# Patient Record
Sex: FEMALE | Race: WHITE | NOT HISPANIC OR LATINO | Employment: OTHER | URBAN - METROPOLITAN AREA
[De-identification: names, ages, dates, MRNs, and addresses within clinical notes are randomized per-mention and may not be internally consistent; named-entity substitution may affect disease eponyms.]

---

## 2023-09-10 PROBLEM — H34.11 CENTRAL RETINAL ARTERY OCCLUSION OF RIGHT EYE: Status: ACTIVE | Noted: 2023-09-10

## 2023-09-10 PROBLEM — I63.50 CEREBROVASCULAR ACCIDENT (CVA) DUE TO OCCLUSION OF CEREBRAL ARTERY (MULTI): Status: ACTIVE | Noted: 2023-09-10

## 2023-09-10 PROBLEM — R00.1 BRADYCARDIA: Status: ACTIVE | Noted: 2023-09-10

## 2023-09-10 PROBLEM — K64.9 HEMORRHOIDS: Status: ACTIVE | Noted: 2023-09-10

## 2023-09-10 PROBLEM — E87.6 HYPOKALEMIA: Status: ACTIVE | Noted: 2023-09-10

## 2023-09-10 PROBLEM — D48.5 NEOPLASM OF UNCERTAIN BEHAVIOR OF SKIN: Status: ACTIVE | Noted: 2023-07-19

## 2023-09-10 PROBLEM — K92.2 GI BLEEDING: Status: ACTIVE | Noted: 2023-09-10

## 2023-09-10 PROBLEM — R60.0 BILATERAL LOWER EXTREMITY EDEMA: Status: ACTIVE | Noted: 2023-09-10

## 2023-09-10 PROBLEM — K52.1 DRUG-INDUCED DIARRHEA: Status: ACTIVE | Noted: 2023-09-10

## 2023-09-10 PROBLEM — R41.89 COGNITIVE DEFICITS: Status: ACTIVE | Noted: 2023-09-10

## 2023-09-10 PROBLEM — T38.0X5A STEROID-INDUCED DIABETES MELLITUS (CORRECT AND PROPERLY ADMINISTERED) (MULTI): Status: ACTIVE | Noted: 2023-09-10

## 2023-09-10 PROBLEM — E09.9 STEROID-INDUCED DIABETES MELLITUS (CORRECT AND PROPERLY ADMINISTERED) (MULTI): Status: ACTIVE | Noted: 2023-09-10

## 2023-09-10 PROBLEM — R15.9 INCONTINENCE OF FECES: Status: ACTIVE | Noted: 2023-09-10

## 2023-09-10 PROBLEM — Z86.73 HISTORY OF STROKE: Status: ACTIVE | Noted: 2023-09-10

## 2023-09-10 PROBLEM — D22.5 MELANOCYTIC NEVI OF TRUNK: Status: ACTIVE | Noted: 2023-07-19

## 2023-09-10 PROBLEM — H61.23 CERUMINOSIS, BILATERAL: Status: ACTIVE | Noted: 2023-09-10

## 2023-09-10 PROBLEM — C44.92 SCCA (SQUAMOUS CELL CARCINOMA) OF SKIN: Status: ACTIVE | Noted: 2023-09-10

## 2023-09-10 PROBLEM — Z79.01 ON APIXABAN THERAPY: Status: ACTIVE | Noted: 2023-09-10

## 2023-09-10 PROBLEM — I48.0 PAROXYSMAL ATRIAL FIBRILLATION (MULTI): Status: ACTIVE | Noted: 2023-09-10

## 2023-09-10 PROBLEM — K21.9 GASTROESOPHAGEAL REFLUX DISEASE WITHOUT ESOPHAGITIS: Status: ACTIVE | Noted: 2023-09-10

## 2023-09-10 PROBLEM — I71.40 ABDOMINAL AORTIC ANEURYSM (AAA) WITHOUT RUPTURE (CMS-HCC): Status: ACTIVE | Noted: 2023-09-10

## 2023-09-10 PROBLEM — N18.9 ANEMIA IN CKD (CHRONIC KIDNEY DISEASE): Status: ACTIVE | Noted: 2023-09-10

## 2023-09-10 PROBLEM — Z79.899 LONG-TERM CURRENT USE OF PROTON PUMP INHIBITOR THERAPY: Status: ACTIVE | Noted: 2023-09-10

## 2023-09-10 PROBLEM — R07.9 CHEST PAIN: Status: ACTIVE | Noted: 2023-09-10
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
## 2023-09-10 PROBLEM — H53.9 VISUAL CHANGES: Status: ACTIVE | Noted: 2023-09-10

## 2023-09-10 PROBLEM — I63.9 RECURRENT STROKES (MULTI): Status: ACTIVE | Noted: 2023-09-10

## 2023-09-10 PROBLEM — R32 URINARY INCONTINENCE: Status: ACTIVE | Noted: 2023-09-10

## 2023-09-10 PROBLEM — H43.813 PVD (POSTERIOR VITREOUS DETACHMENT), BOTH EYES: Status: ACTIVE | Noted: 2023-09-10

## 2023-09-10 PROBLEM — D64.89 OTHER SPECIFIED ANEMIAS: Status: RESOLVED | Noted: 2023-09-10 | Resolved: 2023-09-10

## 2023-09-10 PROBLEM — E11.9 TYPE 2 DIABETES MELLITUS (MULTI): Status: ACTIVE | Noted: 2023-09-10

## 2023-09-10 PROBLEM — H90.3 BILATERAL SENSORINEURAL HEARING LOSS: Status: ACTIVE | Noted: 2023-09-10

## 2023-09-10 PROBLEM — D63.1 ANEMIA IN CKD (CHRONIC KIDNEY DISEASE): Status: ACTIVE | Noted: 2023-09-10

## 2023-09-10 PROBLEM — N63.20 MASS OF BREAST, LEFT: Status: ACTIVE | Noted: 2023-09-10

## 2023-09-10 PROBLEM — H31.103 MYOPIC RETINOPATHY OF BOTH EYES: Status: ACTIVE | Noted: 2023-09-10

## 2023-09-10 PROBLEM — D64.89 OTHER SPECIFIED ANEMIAS: Status: ACTIVE | Noted: 2023-09-10

## 2023-09-10 PROBLEM — Z85.820 PERSONAL HISTORY OF MALIGNANT MELANOMA OF SKIN: Status: ACTIVE | Noted: 2023-07-19

## 2023-09-10 PROBLEM — R13.12 OROPHARYNGEAL DYSPHAGIA: Status: ACTIVE | Noted: 2023-09-10

## 2023-09-10 PROBLEM — Z96.649 S/P HIP HEMIARTHROPLASTY: Status: ACTIVE | Noted: 2023-09-10

## 2023-09-10 PROBLEM — R91.8 OPACITY OF LUNG ON IMAGING STUDY: Status: ACTIVE | Noted: 2023-09-10

## 2023-09-10 PROBLEM — T14.8XXA HEMATOMA: Status: ACTIVE | Noted: 2023-09-10

## 2023-09-10 PROBLEM — R53.81 PHYSICAL DEBILITY: Status: ACTIVE | Noted: 2023-09-10

## 2023-09-10 PROBLEM — E78.5 HYPERLIPIDEMIA: Status: ACTIVE | Noted: 2023-09-10
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
## 2023-09-10 PROBLEM — T14.8XXA HEMATOMA: Status: RESOLVED | Noted: 2023-09-10 | Resolved: 2023-09-10

## 2023-09-10 PROBLEM — S72.009A CLOSED FRACTURE OF HIP (MULTI): Status: ACTIVE | Noted: 2023-09-10

## 2023-09-10 PROBLEM — I10 ESSENTIAL HYPERTENSION: Status: ACTIVE | Noted: 2023-09-10

## 2023-09-10 PROBLEM — J45.20 INTERMITTENT ASTHMA (HHS-HCC): Status: ACTIVE | Noted: 2023-09-10

## 2023-09-10 PROBLEM — R29.6 RECURRENT FALLS: Status: ACTIVE | Noted: 2023-09-10

## 2023-09-10 PROBLEM — N18.30 CKD (CHRONIC KIDNEY DISEASE) STAGE 3, GFR 30-59 ML/MIN (MULTI): Status: ACTIVE | Noted: 2023-09-10

## 2023-09-10 PROBLEM — N18.4 CKD (CHRONIC KIDNEY DISEASE) STAGE 4, GFR 15-29 ML/MIN (MULTI): Status: ACTIVE | Noted: 2023-09-10

## 2023-09-10 PROBLEM — G43.109 MIGRAINE WITH VISUAL AURA: Status: ACTIVE | Noted: 2023-09-10

## 2023-09-10 PROBLEM — R73.9 HYPERGLYCEMIA: Status: ACTIVE | Noted: 2023-09-10

## 2023-09-10 PROBLEM — M35.3 PMR (POLYMYALGIA RHEUMATICA) (MULTI): Status: ACTIVE | Noted: 2023-09-10

## 2023-09-10 PROBLEM — D64.9 ANEMIA: Status: ACTIVE | Noted: 2023-09-10

## 2023-09-10 PROBLEM — G47.00 INSOMNIA: Status: ACTIVE | Noted: 2023-09-10

## 2023-09-10 PROBLEM — L57.0 ACTINIC KERATOSIS: Status: ACTIVE | Noted: 2023-07-19

## 2023-09-10 PROBLEM — I69.359 HEMIPARESIS AFFECTING DOMINANT SIDE AS LATE EFFECT OF STROKE (MULTI): Status: ACTIVE | Noted: 2023-09-10

## 2023-09-10 PROBLEM — K58.9 IRRITABLE BOWEL SYNDROME: Status: ACTIVE | Noted: 2023-09-10

## 2023-09-10 RX ORDER — CALCIUM CARBONATE 500(1250)
1 TABLET,CHEWABLE ORAL DAILY
COMMUNITY
End: 2023-12-27 | Stop reason: ALTCHOICE

## 2023-09-10 RX ORDER — OMEPRAZOLE 20 MG/1
1 TABLET, DELAYED RELEASE ORAL DAILY
COMMUNITY
End: 2023-12-27 | Stop reason: ALTCHOICE

## 2023-09-10 RX ORDER — POLYETHYLENE GLYCOL 3350 17 G/17G
POWDER, FOR SOLUTION ORAL
COMMUNITY
Start: 2021-11-23

## 2023-09-10 RX ORDER — ROSUVASTATIN CALCIUM 40 MG/1
1 TABLET, COATED ORAL NIGHTLY
COMMUNITY
Start: 2021-07-27 | End: 2023-12-27 | Stop reason: WASHOUT

## 2023-09-10 RX ORDER — GABAPENTIN 100 MG/1
2 CAPSULE ORAL NIGHTLY
COMMUNITY
Start: 2020-10-22

## 2023-09-10 RX ORDER — TRAMADOL HYDROCHLORIDE 50 MG/1
0.5 TABLET ORAL EVERY 6 HOURS PRN
COMMUNITY
Start: 2021-05-25 | End: 2023-12-27 | Stop reason: ALTCHOICE

## 2023-09-10 RX ORDER — ACETAMINOPHEN 325 MG/1
2 TABLET ORAL EVERY 6 HOURS PRN
COMMUNITY
Start: 2021-05-25 | End: 2023-12-27 | Stop reason: ALTCHOICE

## 2023-09-10 RX ORDER — HYDROCHLOROTHIAZIDE 12.5 MG/1
1 CAPSULE ORAL DAILY
COMMUNITY

## 2023-09-10 RX ORDER — DOCUSATE SODIUM 100 MG/1
1 CAPSULE, LIQUID FILLED ORAL 2 TIMES DAILY
COMMUNITY
Start: 2020-10-22

## 2023-09-10 RX ORDER — CITALOPRAM 10 MG/1
1 TABLET ORAL DAILY
COMMUNITY
Start: 2020-10-22 | End: 2023-12-27 | Stop reason: ALTCHOICE

## 2023-09-10 RX ORDER — ATORVASTATIN CALCIUM 80 MG/1
80 TABLET, FILM COATED ORAL NIGHTLY
COMMUNITY

## 2023-09-10 RX ORDER — AMLODIPINE BESYLATE 5 MG/1
1 TABLET ORAL 2 TIMES DAILY
COMMUNITY
Start: 2020-10-22

## 2023-09-10 RX ORDER — MONTELUKAST SODIUM 10 MG/1
1 TABLET ORAL NIGHTLY
COMMUNITY
Start: 2020-10-22

## 2023-09-10 RX ORDER — BISACODYL 10 MG/1
10 SUPPOSITORY RECTAL DAILY PRN
COMMUNITY
Start: 2020-10-22

## 2023-09-10 RX ORDER — CYANOCOBALAMIN (VITAMIN B-12) 500 MCG
1 TABLET ORAL NIGHTLY
COMMUNITY
Start: 2020-10-22

## 2023-09-10 RX ORDER — VIT C/E/ZN/COPPR/LUTEIN/ZEAXAN 250MG-90MG
CAPSULE ORAL
COMMUNITY

## 2023-09-10 RX ORDER — PREDNISONE 2.5 MG/1
2.5 TABLET ORAL
COMMUNITY
Start: 2021-10-21

## 2023-09-10 RX ORDER — CHOLECALCIFEROL (VITAMIN D3)
CRYSTALS MISCELLANEOUS
COMMUNITY
Start: 2021-11-23 | End: 2023-12-27 | Stop reason: ALTCHOICE

## 2023-09-10 RX ORDER — METFORMIN HYDROCHLORIDE 500 MG/1
0.5 TABLET ORAL
COMMUNITY
End: 2023-12-27 | Stop reason: ALTCHOICE

## 2023-09-10 RX ORDER — SENNOSIDES 8.6 MG/1
1 TABLET ORAL NIGHTLY PRN
COMMUNITY

## 2023-09-10 RX ORDER — OMEPRAZOLE 40 MG/1
40 CAPSULE, DELAYED RELEASE ORAL DAILY
COMMUNITY
End: 2023-12-27 | Stop reason: ALTCHOICE

## 2023-09-10 RX ORDER — FERROUS SULFATE, DRIED 160(50) MG
TABLET, EXTENDED RELEASE ORAL
COMMUNITY
Start: 2021-11-23

## 2023-09-10 RX ORDER — POTASSIUM CHLORIDE 20 MEQ/1
1 TABLET, EXTENDED RELEASE ORAL DAILY
COMMUNITY

## 2023-09-10 RX ORDER — LIDOCAINE 50 MG/G
PATCH TOPICAL
COMMUNITY
Start: 2021-05-25

## 2023-09-10 RX ORDER — CYANOCOBALAMIN (VITAMIN B-12) 500 MCG
1 TABLET ORAL DAILY
COMMUNITY
End: 2023-12-27 | Stop reason: ALTCHOICE

## 2023-09-10 RX ORDER — ASCORBIC ACID 500 MG
500 TABLET ORAL DAILY
COMMUNITY

## 2023-09-10 RX ORDER — FERROUS SULFATE 325(65) MG
1 TABLET, DELAYED RELEASE (ENTERIC COATED) ORAL DAILY
COMMUNITY

## 2023-09-10 RX ORDER — ACETAMINOPHEN 500 MG
1 TABLET ORAL AS NEEDED
COMMUNITY
Start: 2020-10-22

## 2023-09-10 RX ORDER — NAPROXEN SODIUM 220 MG/1
1 TABLET, FILM COATED ORAL DAILY
COMMUNITY
End: 2023-12-27 | Stop reason: ALTCHOICE

## 2023-09-10 RX ORDER — METFORMIN HYDROCHLORIDE 500 MG/1
1 TABLET ORAL
COMMUNITY
Start: 2021-01-28 | End: 2023-12-27 | Stop reason: ALTCHOICE

## 2023-09-10 RX ORDER — PREDNISONE 5 MG/1
1 TABLET ORAL DAILY
COMMUNITY
End: 2023-12-27 | Stop reason: DRUGHIGH

## 2023-09-10 RX ORDER — HYDROCORTISONE 25 MG/G
CREAM TOPICAL
COMMUNITY
Start: 2021-11-23

## 2023-10-13 ENCOUNTER — APPOINTMENT (OUTPATIENT)
Dept: OPHTHALMOLOGY | Facility: CLINIC | Age: 88
End: 2023-10-13
Payer: MEDICARE

## 2023-10-14 DIAGNOSIS — Z46.1 HEARING AID FITTING OR ADJUSTMENT: ICD-10-CM

## 2023-10-14 DIAGNOSIS — H90.3 BILATERAL SENSORINEURAL HEARING LOSS: Primary | ICD-10-CM

## 2023-10-14 NOTE — PROGRESS NOTES
NP received request from Bret RUFFIN on 10/13/2023 that patient needs a referral to Audiology at  to have hearing aids adjusted.   Referral Sent

## 2023-10-23 ENCOUNTER — OFFICE VISIT (OUTPATIENT)
Dept: DERMATOLOGY | Facility: CLINIC | Age: 88
End: 2023-10-23
Payer: MEDICARE

## 2023-10-23 DIAGNOSIS — C44.329: Primary | ICD-10-CM

## 2023-10-23 PROCEDURE — 1126F AMNT PAIN NOTED NONE PRSNT: CPT | Performed by: DERMATOLOGY

## 2023-10-23 PROCEDURE — 99214 OFFICE O/P EST MOD 30 MIN: CPT | Performed by: DERMATOLOGY

## 2023-10-23 PROCEDURE — 17311 MOHS 1 STAGE H/N/HF/G: CPT | Performed by: DERMATOLOGY

## 2023-10-23 PROCEDURE — 1159F MED LIST DOCD IN RCRD: CPT | Performed by: DERMATOLOGY

## 2023-10-23 NOTE — PROGRESS NOTES
Mohs Surgery Operative Note    Date of Surgery:  10/23/2023  Surgeon:  Ruslan Guerrero MD  Office Location: [facility] [address]  [place] [address]  [facility] [address]  [facility] [address]  Dept: [phone]  Dept Fax: [phone]  Referring Provider: David Matthew MD  [id number]  Department of Dermatology  [place] [id number]      Assessment/Plan   Pre-procedure:   Obtained informed consent: written from patient  The surgical site was identified and confirmed with the patient.     Intra-operative:   Audible time out called at : 10:18 AM 10/23/23  by: Courtney Chowdary MA   Verified patient name, birthdate, site, specimen bottle label & requisition.    The planned procedure(s) was again reviewed with the patient. The risks of bleeding, infection, nerve damage and scarring were reviewed. Written authorization was obtained. The patient identity, surgical site, and planned procedure(s) were verified. The provider acted as both surgeon and pathologist.     Squamous cell carcinoma of skin  right lateral chin    Mohs surgery    Consent obtained: written    Universal Protocol:  Procedure explained and questions answered to patient or proxy's satisfaction: Yes    Test results available and properly labeled: Yes    Pathology report reviewed: Yes    External notes reviewed: Yes    Photo or diagram used for site identification: Yes    Site/side marked: Yes    Slide independently reviewed by Mohs surgeon: Yes    Immediately prior to procedure a time out was called: Yes    Patient identity confirmed: verbally with patient  Preparation: Patient was prepped and draped in usual sterile fashion      Anticoagulation:  Is the patient taking prescription anticoagulant and/or aspirin prescribed/recommended by a physician? Yes    Was the anticoagulation regimen changed prior to Mohs? No      Anesthesia:  Anesthesia method: local infiltration  Local anesthetic: lidocaine 2% WITH  epi    Procedure Details:  Biopsy accession number: [id number]  Date of biopsy: 5/17/2023  Pre-Op diagnosis: squamous cell carcinoma  SCC subtype: in situ  Surgery side: right  Surgical site (from skin exam): right lateral chin  Pre-operative length (cm): 0.7  Pre-operative width (cm): 0.7  Indications for Mohs surgery: anatomic location where tissue conservation is critical  Mohs Appropriate Use Criteria Score: 1    Micrographic Surgery Details:  Post-operative length (cm): 1  Post-operative width (cm): 1  Number of Mohs stages: 1    Stage 1     Comments: The patient was brought into the operating room and placed in the procedure chair in the appropriate position.  The area positive by previous biopsy was identified and confirmed with the patient. The area of clinically obvious tumor was debulked using a curette and/or scalpel as needed. An incision was made following the Mohs approach through the skin. The specimen was taken to the lab, divided into 2 piece(s) and appropriately chromacoded and processed.                 Tumor features identified on Mohs section: no tumor identified    Depth of defect: dermis    Patient tolerance of procedure: tolerated well, no immediate complications    Reconstruction:  Was the defect reconstructed?: No    Fine/surface layer approximation (top stitches)   Hemostasis achieved with: electrodesiccation  Outcome: patient tolerated procedure well with no complications    Post-procedure details: sterile dressing applied and wound care instructions given    Dressing type: Gelfoam and pressure dressing    Additional details:  Repair: After a discussion with the patient regarding the options for wound closure, a decision was made to proceed with second intention healing.  Dressing F/U: Surgifoam was placed in the wound. A pressure dressing was placed to help stabilize the wound and to minimize the risk of postoperative bleeding. Wound care was discussed, and the patient was given written  post-operative wound care instructions.                   The final repair measured 1.0 x 1.0 cm    Wound care was discussed, and the patient was given written post-operative wound care instructions.      The patient will follow up with Ruslan Guerrero MD as needed for any post operative problems or concerns, and will follow up with their primary dermatologist as scheduled.

## 2023-10-23 NOTE — PROGRESS NOTES
Office Visit Note  Date: 10/23/2023  Surgeon:  Ruslan Guerrero MD  Office Location: [facility] [address]  [facility]  [address]  [facility] [address]  [facility] [address]  Dept: [phone]  Dept Fax: [phone]  Referring Provider: David Matthew MD  [id number]  Department of Dermatology  [place] [id number]    [facility]   Courtney Zhou is a 89 y.o. female who presents for the following: MOHS Surgery    According to the patient, the lesion has been presnt for approximately greater than 1 year at the time of diagnosis.  The lesion is painful.  The lesion has not been treated previously.    The patient does not have a pacemaker / defibrillator.    The patient is on blood thinners.  The patient does not have a history of hepatitis B or C.  The patient does not have a history of HIV.    Review of Systems:  No other skin or systemic complaints other than what is documented elsewhere in the note.    MEDICAL HISTORY: clinically relevant history including significant past medical history, medications and allergies was reviewed and documented in Epic.    Objective   Well appearing patient in no apparent distress; mood and affect are within normal limits.  Vital signs: See record.  Noted on the right lateral chin  Is a 0.7 x 0.7 cm scar        The patient confirmed the identified site.    Discussion:  The nature of the diagnosis was explained. The lesion is a skin cancer.  It has a risk of local growth and distant spread. The condition is associated with sun exposure.  Warning signs of non-melanoma skin cancer discussed. Patient was instructed to perform monthly self skin examination.  We recommended that the patient have regular full skin exams given an increased risk of subsequent skin cancers. The patient was instructed to use sun protective behaviors including use of broad spectrum sunscreens and sun protective clothing to reduce risk of skin cancers.      Risks, benefits, side  effects of Mohs surgery were discussed with patient and the patient voiced understanding.  It was explained that even though the cure rate of Mohs is very high it is not 100%. Risks of surgery including but not limited to bleeding, infection, numbness, nerve damage, and scar were reviewed.  Discussion included wound care requirements, activity restrictions, likely scar outcome and time to heal.     After Mohs surgery, the defect may need to be repaired surgically and the scar may be longer than the original lesion.  Reconstruction options, risks, and benefits were reviewed including second intention healing, linear repair (4-1 ratio was explained), local flaps, skin grafts, cartilage grafts and interpolation flaps (the need for multiple surgeries was explained). Possible outcomes were reviewed including likely scar appearance, failure of flap survival, infection, bleeding and the need for revision surgery.     The pathology was reviewed, the photograph was reviewed, and the referring physician's note was reviewed.    Patient elected for Mohs surgery.    The patient has a squamous cell carcinoma.  The pathology was reviewed, the photograph was reviewed, and the referring physicians note was reviewed.  Multiple treatment options including mohs surgery (which has moderate risk of morbidity) were reviewed.    Medical Decision Making  Column 1- Squamous Cell Carcinoma (1 acute uncomplicated illness- Low)  Column 2- 3 tests reviewed (pathology, photograph, referring physician notes- Moderate)  Column 3- Modertate risk of morbidity from additional treatment- mohs surgry- Moderate)    Overall Moderate MDM

## 2023-11-17 ENCOUNTER — OFFICE VISIT (OUTPATIENT)
Dept: OPHTHALMOLOGY | Facility: CLINIC | Age: 88
End: 2023-11-17
Payer: MEDICARE

## 2023-11-17 DIAGNOSIS — H43.813 PVD (POSTERIOR VITREOUS DETACHMENT), BOTH EYES: Primary | ICD-10-CM

## 2023-11-17 DIAGNOSIS — H31.103 MYOPIC RETINOPATHY OF BOTH EYES: ICD-10-CM

## 2023-11-17 DIAGNOSIS — E11.00 TYPE 2 DIABETES MELLITUS WITH HYPEROSMOLARITY WITHOUT COMA, WITHOUT LONG-TERM CURRENT USE OF INSULIN (MULTI): ICD-10-CM

## 2023-11-17 DIAGNOSIS — H34.11 CENTRAL RETINAL ARTERY OCCLUSION OF RIGHT EYE: ICD-10-CM

## 2023-11-17 PROCEDURE — 92134 CPTRZ OPH DX IMG PST SGM RTA: CPT | Mod: BILATERAL PROCEDURE | Performed by: OPHTHALMOLOGY

## 2023-11-17 PROCEDURE — 99213 OFFICE O/P EST LOW 20 MIN: CPT | Performed by: OPHTHALMOLOGY

## 2023-11-17 RX ORDER — ESOMEPRAZOLE MAGNESIUM 40 MG/1
CAPSULE, DELAYED RELEASE ORAL
COMMUNITY
End: 2023-12-27 | Stop reason: ALTCHOICE

## 2023-11-17 RX ORDER — LOSARTAN POTASSIUM 100 MG/1
TABLET ORAL
COMMUNITY
End: 2023-12-27 | Stop reason: WASHOUT

## 2023-11-17 RX ORDER — FLUTICASONE PROPIONATE AND SALMETEROL 250; 50 UG/1; UG/1
POWDER RESPIRATORY (INHALATION)
COMMUNITY

## 2023-11-17 RX ORDER — CHLORHEXIDINE GLUCONATE ORAL RINSE 1.2 MG/ML
SOLUTION DENTAL
COMMUNITY
End: 2024-02-09 | Stop reason: ALTCHOICE

## 2023-11-17 RX ORDER — CLOBETASOL PROPIONATE 0.5 MG/G
CREAM TOPICAL
COMMUNITY

## 2023-11-17 RX ORDER — LOPERAMIDE HYDROCHLORIDE 2 MG/1
CAPSULE ORAL
COMMUNITY
Start: 2023-09-19

## 2023-11-17 RX ORDER — METOPROLOL SUCCINATE 25 MG/1
TABLET, EXTENDED RELEASE ORAL
COMMUNITY
End: 2023-12-27 | Stop reason: ALTCHOICE

## 2023-11-17 ASSESSMENT — ENCOUNTER SYMPTOMS
PSYCHIATRIC NEGATIVE: 0
ENDOCRINE NEGATIVE: 0
ALLERGIC/IMMUNOLOGIC NEGATIVE: 0
GASTROINTESTINAL NEGATIVE: 0
HEMATOLOGIC/LYMPHATIC NEGATIVE: 0
MUSCULOSKELETAL NEGATIVE: 0
RESPIRATORY NEGATIVE: 0
CONSTITUTIONAL NEGATIVE: 0
EYES NEGATIVE: 0
CARDIOVASCULAR NEGATIVE: 0
NEUROLOGICAL NEGATIVE: 0

## 2023-11-17 ASSESSMENT — TONOMETRY
IOP_METHOD: TONOPEN
OD_IOP_MMHG: 15
OS_IOP_MMHG: 14

## 2023-11-17 ASSESSMENT — VISUAL ACUITY
OS_CC: 20/30
OS_PH_CC: 20/25
METHOD: SNELLEN - LINEAR
CORRECTION_TYPE: GLASSES
OD_CC: 20/40

## 2023-11-17 NOTE — PROGRESS NOTES
Assessment/Plan   Diagnoses and all orders for this visit:  PVD (posterior vitreous detachment), both eyes  -     OCT, Retina - OU - Both Eyes  Myopic retinopathy of both eyes  Central retinal artery occlusion of right eye  Type 2 diabetes mellitus with hyperosmolarity without coma, without long-term current use of insulin (CMS/HCC)    1 H34.11 Central retinal artery occlusion of right eye-Improving  2 H52.13 Myopic retinopathy of both eyes-Stable  3 G43.109 Migraine with visual aura-Stable  4 H43.813 Pvd (posterior vitreous detachment), both eyes-Stable  5 H50.30 Esotropia, intermittent-Stable  6 H53.431 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-Stable          Discussion      PROCEDURE PRIOR  Fundus  image : Fundus: no edema                                    Optic nerve : No edema OU no NVD                                  Macula NO CME OU but dull reflex OU    Fluorescein angiogram (FA) performed after appropriate counselling and discussion with consent  Transit OD  flow time normal (hand to eye)    Late vascular blockage OD central infero temoral  No optic disc leakage no CME OU      This FA is c/w  incomplete BRAO     this is equivalnet to a stroke  pt family informed       she needs to see Cardiology asap (will arrange)  echo heart   carotid duplex    f/u 6m

## 2023-11-20 ENCOUNTER — CLINICAL SUPPORT (OUTPATIENT)
Dept: AUDIOLOGY | Facility: HOSPITAL | Age: 88
End: 2023-11-20
Payer: MEDICARE

## 2023-11-20 DIAGNOSIS — H90.3 BILATERAL SENSORINEURAL HEARING LOSS: Primary | ICD-10-CM

## 2023-11-20 PROCEDURE — 92557 COMPREHENSIVE HEARING TEST: CPT | Performed by: AUDIOLOGIST

## 2023-11-20 PROCEDURE — 92567 TYMPANOMETRY: CPT | Performed by: AUDIOLOGIST

## 2023-11-20 NOTE — PROGRESS NOTES
"  ADULT AUDIOMETRIC EVALUATION    Name:  Courtney Zhou  :  10/7/1932  Age:  89 y.o.  Date of Evaluation:  2023    HISTORY:  Reason for visit: Ms. Zhou is seen today for an evaluation of hearing. Patient was accompanied by her  son, Dano . She was referred by Bruna Cloud CNP. Ms Zhou sees Salud Gambino CNP for cerumen management.     Patient and son report history of a stroke in 2020. Courtney was fit with hearing aids 2019 in [facility].     Denies: history of otologic surgery, otalgia, tinnitus, vertigo/dizziness    Hearing Aid History:  Patient wears [employer] Opn S hearing aids fit in  at a facility in [place]    EVALUATION:    See Audiogram and Immittance results under \"Media\".    RESULTS:     Otoscopic Evaluation:     RIGHT  Scant amount of non-occluding cerumen    LEFT  Clear canal with tympanic membrane visualized    Immittance:   Immittance Measures: 226 Hz          Right Ear: Type A: Normal middle ear function         Left Ear:  Type A: Normal middle ear function    Reflexes and Reflex Decay:    Ipsilateral Reflexes (1000 Hz):          Probe/Stimulus Right Ear: absent       Probe/Stimulus Left Ear: absent    Audiometry:  Test Technique: Standard Audiometry under insert phones.    Reliability: Good     Pure Tone Audiometry:    Right: Moderate to moderately-severe sensorineural hearing loss 125-8000 Hz   Left: Moderate to moderately-severe sensorineural hearing loss 125-8000 Hz     Speech Audiometry (via recorded, 25-words unless noted; M=masked):       Right Ear: Speech Reception Threshold (SRT) was obtained at 50 dBHL  Word Recognition Scores were Good (88%) in quiet when words were presented at 90 dBHL, using the NU-6 2A word list.  Left Ear: Speech Reception Threshold (SRT) was obtained at 50 dBHL  Word Recognition Scores were Good (84%) in quiet when words were presented at 90 dBHL, using the NU-6 3A word list.    IMPRESSIONS:  Today's test results suggest " normal middle ear function and moderate to moderately-severe sensorineural hearing loss in both ears. Word understanding is good, bilaterally.    PATIENT EDUCATION:   Ms. Zhou and her son were counseled with regard to the findings.     HEARING AID CHECK    Patient has O[employer] Opn S 2 hearing aids in color steel gray. She uses #2-85 dB receivers with double bass domes.  Right SN: [id number]  Left SN: [id number]    Patient's phone was updated to the O[employer]on  [place]. [employer] On [place] was deleted from the patient's phone. Dr. Morel completed a trial phone call and no issues were noted.    Due to scheduling conflicts, the hearing aid software was not accessible during today's visit. Patient and son were counseled to schedule a hearing aid check if this appointment type is desired.      PLAN:  Medical follow up with Salud Gambino CNP as directed for cerumen management.   Follow up with Bruna Cloud CNP as directed.  Retest hearing annually; sooner if concerns or changes arise.  Continued use of hearing aids during all waking and dry hours.  Return to clinic for a hearing aid check/reprogramming, if desired.      Elva Gatica, LEWIS, CCC-A  Clinical Audiologist    Time: [id number]    Degree of   Hearing Sensitivity dB Range   Within Normal Limits (WNL) 0 - 20   Slight 25   Mild 26 - 40   Moderate 41 - 55   Moderately-Severe 56 - 70   Severe 71 - 90   Profound 91 +     KEY  TM Tympanic Membrane   WNL Within Normal Limits   HA Hearing Aid   SNHL Sensorineural Hearing Loss   CHL Conductive Hearing Loss   NIHL Noise-Induced Hearing Loss   ECV Ear Canal Volume

## 2023-12-13 ENCOUNTER — OFFICE VISIT (OUTPATIENT)
Dept: DERMATOLOGY | Facility: CLINIC | Age: 88
End: 2023-12-13
Payer: MEDICARE

## 2023-12-13 DIAGNOSIS — D48.9 NEOPLASM OF UNCERTAIN BEHAVIOR: Primary | ICD-10-CM

## 2023-12-13 PROCEDURE — 1159F MED LIST DOCD IN RCRD: CPT | Performed by: STUDENT IN AN ORGANIZED HEALTH CARE EDUCATION/TRAINING PROGRAM

## 2023-12-13 PROCEDURE — 1160F RVW MEDS BY RX/DR IN RCRD: CPT | Performed by: STUDENT IN AN ORGANIZED HEALTH CARE EDUCATION/TRAINING PROGRAM

## 2023-12-13 PROCEDURE — 99213 OFFICE O/P EST LOW 20 MIN: CPT | Performed by: STUDENT IN AN ORGANIZED HEALTH CARE EDUCATION/TRAINING PROGRAM

## 2023-12-13 PROCEDURE — 1126F AMNT PAIN NOTED NONE PRSNT: CPT | Performed by: STUDENT IN AN ORGANIZED HEALTH CARE EDUCATION/TRAINING PROGRAM

## 2023-12-13 PROCEDURE — 1036F TOBACCO NON-USER: CPT | Performed by: STUDENT IN AN ORGANIZED HEALTH CARE EDUCATION/TRAINING PROGRAM

## 2023-12-13 NOTE — PROGRESS NOTES
Subjective     Courtney Zhou is a 89 y.o. female who presents for the following: Skin Check (Scalp follow up).     Review of Systems:  No other skin or systemic complaints other than what is documented elsewhere in the note.    The following portions of the chart were reviewed this encounter and updated as appropriate:          Skin Cancer History  No skin cancer on file.      Specialty Problems          Dermatology Problems    Actinic keratosis    Melanocytic nevi of trunk    Neoplasm of uncertain behavior of skin    Personal history of malignant melanoma of skin    SCCA (squamous cell carcinoma) of skin        Objective   Well appearing patient in no apparent distress; mood and affect are within normal limits.    A focused skin examination was performed. All findings within normal limits unless otherwise noted below.    Assessment/Plan   1. Neoplasm of uncertain behavior      At least HAK's  Of note right chin SCCIS has been taken care of by Dr. Givens  Right vertex : 3x 2.5    Left mid vertex: 1x1cm    Keratotc plaques    Monitoring    Son will check he took phone photos if bigger, more painful, more red will re-eval    6 month follow up or sooner  If son feels comfportable monitoring at home that's ok too

## 2023-12-13 NOTE — PATIENT INSTRUCTIONS
Check scalp size of lesion every 3 month  We have 2.5x3cm on right side and more 1x1cm on left mid scalp  If areas are more painful, red , swollen, thick, let me know  If size is the same ok to cancel 6 month appointment and notify me if any of the above occur  Areas are at least pre cancerous but if cancerous and progressing we may want to act on it to prevent suture discomfort

## 2023-12-14 ENCOUNTER — OFFICE VISIT (OUTPATIENT)
Dept: OTOLARYNGOLOGY | Facility: CLINIC | Age: 88
End: 2023-12-14
Payer: MEDICARE

## 2023-12-14 VITALS — HEIGHT: 64 IN | TEMPERATURE: 97.9 F | BODY MASS INDEX: 22.2 KG/M2 | WEIGHT: 130 LBS

## 2023-12-14 DIAGNOSIS — H90.3 SENSORINEURAL HEARING LOSS (SNHL) OF BOTH EARS: ICD-10-CM

## 2023-12-14 DIAGNOSIS — H61.23 BILATERAL IMPACTED CERUMEN: Primary | ICD-10-CM

## 2023-12-14 PROCEDURE — 1159F MED LIST DOCD IN RCRD: CPT | Performed by: NURSE PRACTITIONER

## 2023-12-14 PROCEDURE — 1036F TOBACCO NON-USER: CPT | Performed by: NURSE PRACTITIONER

## 2023-12-14 PROCEDURE — 1126F AMNT PAIN NOTED NONE PRSNT: CPT | Performed by: NURSE PRACTITIONER

## 2023-12-14 PROCEDURE — 99213 OFFICE O/P EST LOW 20 MIN: CPT | Performed by: NURSE PRACTITIONER

## 2023-12-14 PROCEDURE — 1160F RVW MEDS BY RX/DR IN RCRD: CPT | Performed by: NURSE PRACTITIONER

## 2023-12-14 ASSESSMENT — PATIENT HEALTH QUESTIONNAIRE - PHQ9
1. LITTLE INTEREST OR PLEASURE IN DOING THINGS: NOT AT ALL
SUM OF ALL RESPONSES TO PHQ9 QUESTIONS 1 AND 2: 0
2. FEELING DOWN, DEPRESSED OR HOPELESS: NOT AT ALL

## 2023-12-14 NOTE — PROGRESS NOTES
Subjective   Patient ID: Courtney Zhou is a 89 y.o. female who presents for Cerumen Impaction.  HPI  Patient presents for scheduled cerumen removal.  She has a history of bilateral sensorineural hearing loss and wears bilateral hearing aids with good benefit.  She is accompanied today by her son.  She reports recent decreased benefit from her hearing aids.  She denies any otalgia, otorrhea.  Review of Systems  A comprehensive or 10 points review of the patient´s constitutional, neurological, HEENT, pulmonary, cardiovascular and genito-urinary systems showed only those mentioned in history of present illness.    Objective   Physical Exam  Constitutional: no fever, chills, weight loss or weight gain   General appearance: Appears well, well-nourished, well groomed. No acute distress.   Communication: Normal communication   Psychiatric: Oriented to person, place and time. Normal mood and affect.   Neurologic: Cranial nerves II-XII grossly intact and symmetric bilaterally.   Head and Face:   Head: Atraumatic with no masses, lesions or scarring.   Face: Normal symmetry, no paralysis, synkinesis or facial tic. No scars or deformities.     Eyes: Conjunctiva not edematous or erythematous   Ears: External inspection of ears with no deformity, scars or masses.  Bilateral canals with cerumen impactions.     Neck: Normal appearing, symmetric, trachea midline.   Cardiovascular: Examination of peripheral vascular system shows no clubbing or cyanosis.   Respiratory: No respiratory distress increased work of breathing. Inspection of the chest with symmetric chest expansion and normal respiratory effort.   Skin: No rashes in the head or neck    Assessment/Plan     This patient presents for subsequent evaluation of acute acquired bilateral cerumen impaction and chronic bilateral sensorineural hearing loss.    Reassurance given that otologic exam is normal after cleaning.  Patient reports improvement and benefit from her hearing aids  after cleaning.  She may follow-up as needed.  All questions were answered to patient and family's satisfaction.    This note was created using speech recognition transcription software. Despite proofreading, several typographical errors might be present that might affect the meaning of the content. Please call with any questions.   Patient ID: Courtney Zhou is a 89 y.o. female.    Ear cerumen removal    Date/Time: 12/14/2023 3:18 PM    Performed by: SHAYNE Ballard  Authorized by: SHAYNE Ballard    Consent:     Consent obtained:  Verbal    Consent given by:  Patient    Risks discussed:  Pain    Alternatives discussed:  No treatment  Procedure details:     Location:  L ear and R ear    Procedure type comment:  Suction, loop, alligator    Procedure outcomes: cerumen removed    Post-procedure details:     Inspection:  No bleeding, ear canal clear and TM intact    Hearing quality:  Improved    Procedure completion:  Tolerated well, no immediate complications      SHAYNE Ballard 12/14/23 3:16 PM

## 2023-12-21 ENCOUNTER — HOME VISIT (OUTPATIENT)
Dept: GERIATRIC MEDICINE | Facility: CLINIC | Age: 88
End: 2023-12-21
Payer: MEDICARE

## 2023-12-21 DIAGNOSIS — I48.0 PAROXYSMAL ATRIAL FIBRILLATION (MULTI): ICD-10-CM

## 2023-12-21 DIAGNOSIS — R25.1 TREMOR OF BOTH HANDS: Primary | ICD-10-CM

## 2023-12-21 DIAGNOSIS — N18.9 ANEMIA IN CHRONIC KIDNEY DISEASE, UNSPECIFIED CKD STAGE: ICD-10-CM

## 2023-12-21 DIAGNOSIS — D63.1 ANEMIA IN CHRONIC KIDNEY DISEASE, UNSPECIFIED CKD STAGE: ICD-10-CM

## 2023-12-21 DIAGNOSIS — F41.1 GAD (GENERALIZED ANXIETY DISORDER): ICD-10-CM

## 2023-12-21 DIAGNOSIS — I63.9 RECURRENT STROKES (MULTI): ICD-10-CM

## 2023-12-21 DIAGNOSIS — Z79.899 POLYPHARMACY: ICD-10-CM

## 2023-12-21 DIAGNOSIS — E11.9 TYPE 2 DIABETES MELLITUS WITHOUT COMPLICATION, WITHOUT LONG-TERM CURRENT USE OF INSULIN (MULTI): ICD-10-CM

## 2023-12-21 DIAGNOSIS — G62.9 NEUROPATHY: ICD-10-CM

## 2023-12-21 DIAGNOSIS — K21.9 GASTROESOPHAGEAL REFLUX DISEASE WITHOUT ESOPHAGITIS: ICD-10-CM

## 2023-12-21 DIAGNOSIS — Z79.01 ON APIXABAN THERAPY: ICD-10-CM

## 2023-12-21 DIAGNOSIS — N63.24 MASS OF LOWER INNER QUADRANT OF LEFT BREAST: ICD-10-CM

## 2023-12-21 PROCEDURE — 99349 HOME/RES VST EST MOD MDM 40: CPT | Performed by: NURSE PRACTITIONER

## 2023-12-21 ASSESSMENT — PAIN SCALES - GENERAL: PAINLEVEL: 0-NO PAIN

## 2023-12-27 VITALS — HEART RATE: 78 BPM | RESPIRATION RATE: 18 BRPM | SYSTOLIC BLOOD PRESSURE: 132 MMHG | DIASTOLIC BLOOD PRESSURE: 60 MMHG

## 2023-12-27 PROBLEM — F41.1 GAD (GENERALIZED ANXIETY DISORDER): Status: ACTIVE | Noted: 2023-12-27

## 2023-12-27 PROBLEM — Z79.899 POLYPHARMACY: Status: ACTIVE | Noted: 2023-09-10

## 2023-12-27 PROBLEM — R25.1 TREMOR OF BOTH HANDS: Status: ACTIVE | Noted: 2023-12-27

## 2023-12-27 PROBLEM — G62.9 NEUROPATHY: Status: ACTIVE | Noted: 2023-12-27

## 2023-12-27 NOTE — PROGRESS NOTES
Some elements may have been copied from prior note(s). The elements have been updated and reflect current evaluation, examination and decision making from today.           Reason for visit:  Tremor to hands       Summary Statement: Mrs. Zhou is an 89 yo elderly woman with a PMH significant central retinal artery occlusion-right eye (while off Xarelto 3/14/2023) ), for+ Covid 19 virus (9/2022), Ischemic left frontal and parietal and right temporal lobe strokes with new right-sided weakness (9/2020), AAA, paroxysmal atrial fibrillation (on Eliquis (developed GI bleeding on Xarelto) ), mitral valve prolapse, HTN, steroid -induced DM, PMR (polymyalgia rheumatic a-on Prednisone- condition worsened when Prednisone was decreased ), Chiari Malformation, CKD Stage 3, dementia, asthma, bradycardia,OA left knee, gait abnormality, GERD,incontinence , hearing loss, right hip hemiarthroplasty s/p fall (5/2021),  and current left breast mass ( declining further workup)     4/27/2022: Incidental findings on CT   include irregular consolidative opacity right lung apex - 8-12 week follow up   recommended to evaluate for resolution, left breast hypodense mass measuring   2.4 x 2.1, and 4.5 cm fusiform infrarenal abdominal aortic aneurysm.        -Squamous Cells, scalp 2013 and 2017 ([facility] surgery 2013)  -Malignant Melanoma left leg(1990's) and upper left arm (2008)  -Breast Cancer (2013)chemotherapy and radiation (completed 4/2014)        Reason for homebound status: Leaving her home requires the assistance of another person.     HPI: Mrs. Zhou is being seen today in her apartment for follow up for management of chronic active illne      Mrs. Zhou is an 89 yo elderly woman with a PMH significant central retinal artery occlusion-right eye (while off Xarelto 3/14/2023) ), for+ Covid 19 virus (9/2022), Ischemic left frontal and parietal and right temporal lobe strokes with new right-sided weakness (9/2020), AAA,  paroxysmal atrial fibrillation (on Xarelto), mitral valve prolapse, HTN, steroid -induced DM, PMR (polymyalgia rheumatic a-on Prednisone- condition worsened when Prednisone was decreased ), Chiari Malformation, CKD Stage 3, dementia, asthma, bradycardia,OA left knee, gait abnormality, GERD,incontinence , hearing loss, right hip hemiarthroplasty s/p fall (5/2021).     4/27/2022: Incidental findings on CT   include irregular consolidative opacity right lung apex - 8-12 week follow up   recommended to evaluate for resolution, left breast hypodense mass measuring   2.4 x 2.1, and 4.5 cm fusiform infrarenal abdominal aortic aneurysm.        -Squamous Cells, scalp 2013 and 2017 ([facility] surgery 2013)  -Malignant Melanoma left leg(1990's) and upper left arm (2008)  -Breast Cancer (2013)chemotherapy and radiation (completed 4/2014)        Reason for homebound status: Leaving her home requires the assistance of another person.     HPI: Mrs. Zhou is being seen today in her apartment for follow up for management of chronic active illnesses.    Patient states she has tremors in both hands.  Does not interfere with ADLs.- No tremor was notice at rest, but asked to stretch arms and hands forward, a prominent tremor was present to the right hand, and resolved when at rest. States that it is distressing and bothersome to her.     States she is sleeping well and would like to continue the low dose Melatonin at night.   An extensive review of medications was done.  We discussed possibly discontinuing some the meds due to polypharmacy.  Patient agrees.     She does not endorse being anxious or depressed.  Appetite is good  No blood in stool or urine  Denies numbness or tingling to hands and feet   NO falls/ED visits or hospitalizations  No cough, cold or flu-like symptoms  No dysuria or frequency  Denies chest pain or sob  No dizziness        Interval History: Patient has not been hospitalized previously.   Current Diet: Regular.    Appetite: good.   Food Consistency: Regular.   Liquids Consistency: thin.   Gait/Mobility: Uses assistive device: Wheelchair   Bathing: dependent.   Dressing: needs assistance.   Toileting: performs independently.   Feeding: performs independently.   Personal Hygiene: performs independently.   Bowels: continent.   Bladder: occasional accident.   Managing Finances: needs assistance.   Shopping: dependent.   Managing Medications: dependent.   Housework / Basic Home Maintenance: dependent.   Laundry: dependent.   Preparing Meals: dependent.    Falls Risk Screening:. Her fall did not result in injury.   Home safety risk factors: none.   Patient's DNR Status: DNR-CC.      Review of Systems     Constitutional: appetite normal,~no fatigue,~no fever,~no chills,~not feeling poorly~and~not feeling tired.   Eyes: no vision problems,~no eye pain,~eyes not red,~no purulent discharge from the eyes,~no dryness of the eyes~and~no itching of the eyes. States vision in right eye has almost returned to baseline.   ENT: hearing loss, but~no earache,~no sinus pressure,~no nosebleeds,~no nasal discharge,~no sore throat~and~no hoarseness.   Cardiovascular: lower extremity edema, but~no chest pain~and~the heart rate was not fast.   Respiratory: no shortness of breath,~not coughing up sputum,~no cough,~no wheezing~and~no shortness of breath during exertion.   Gastrointestinal: no abdominal pain,~no nausea,~no vomiting,~no dysphagia,~no diarrhea,~no constipation~and~no blood in stools.   Genitourinary: no dysuria,~no incontinence,~no change in urinary frequency,~no hematuria~and~no unexplained vaginal bleeding.   Musculoskeletal: arthralgias, but~no difficulty walking,~no myalgias,~no muscle aches,~no joint stiffness,~no muscle cramps,~no limb swelling~and~no back pain.   Skin: + skin left breast lump, but~no rashes~and~no itching.   Neurological: limb weakness, but~no headache,~no dizziness,~no mental status change,~no confusion,~no  convulsions,~tremors to hands~no numbness,~no fainting/syncope,~no tingling/paresthesia~and~no difficulty with balance.   Psychiatric: no sleep disturbances,~not under stress,~no anxiety,~no depression~and~not suicidal.   Endocrine: no hot flashes.   Hematologic/Lymphatic: a tendency for easy bruising, but~no tendency for easy bleeding.      Physical Exam     Constitutional   General appearance: Alert, cooperative and in no acute distress.   Head and Face   Head and face: Normal. ~   External palpation of the face and sinuses: Normal.~Examination/ inspection of hair and scalp: Normal.   Eyes   Inspection of eyes: Sclera and conjunctiva were normal.~   Pupil exam: JAY. Extraocular movements were intact.~wears eyeglasses.   Ears, Nose, Mouth, and Throat   Ears: Normal  Oropharynx: Normal with moist mucus membranes, tongue midline, no PND, no erythema or enlargement of tonsils.~   Hearing: Abnormal. ~ wears hearing aids [place]  Lips, teeth, and gums: Normal.  Neck   Neck Exam: Appearance of the neck was normal. No neck masses observed. No jugular vein distension.   Pulmonary   Respiratory assessment: No respiratory distress, normal respiratory rhythm and effort.~   Auscultation of Lungs: Clear bilateral breath sounds. No rales, rhonchi or wheezes.   Cardiovascular   Auscultation of heart: Apical pulse normal, heart rate and rhythm normal, normal S1 and S2, no murmurs, no gallops and no pericardial rub.~   Carotid arteries: Pulses normal with no bruits.~   Pedal pulses: eferred  Examination of extremities for edema and/or varicosities: Abnormal.  left ankle trace+ pitting edema~and~left pretibial +1+ pretibial pitting edema. (Unchanged from previous visit)   Chest   Breast exam: Abnormal. ~   Chest: Abnormal. ~palpable nodule left breast -no lymphadenopathy -unchanged from previous visits.   Abdomen   Abdominal Exam: No bruits normal bowel sounds, soft, non-tender, no abdominal masses palpated.   Genitourinary   Bladder:  Normal on palpation.~wearing a diaper.   Lymphatic   Palpation of lymph nodes in axillae: Normal. ~   Palpation of lymph nodes in neck: No cervical lymphadenopathy.~   Palpation of lymph nodes in other areas: Normal.    Musculoskeletal   Digits and nails: Abnormal. ~   Inspection/palpation of joints, bones, and muscles: Abnormal. ~   Range of motion: Abnormal. ~   Muscle strength/tone: Normal.    Skin   Skin and subcutaneous tissue: Abnormal. ~previous scarring from skin cancer treatment to bridge of nose, and lesion-flat-discolored, nickel-quarter size lesion to left cheek.  Neurologic   Cranial nerves: Abnormal. ~speech slightly dysarthric, [place], Right sided weakness.   Psychiatric   Judgment and insight: Intact and appropriate.~   Orientation: Oriented to person, place, and time.~   Mood and affect: Normal.~   Recent and remote memory: Normal.        Labs reviewed-most recent in chart at the facility    ASSESSMENT/PLAN    1. Tremor of both hands  -essential tremor  -bothersome to patient   -will try patient on Propanolol 20 mg daily x one week  , then bid  -Notify MD if HR is consistently , less than 56  BPM for a re-evaluation   -Plan of care is ongoing    2. Type 2 diabetes mellitus without complication, without long-term current use of insulin (CMS/formerly [facility])  -will trial off of Metformin 250 mg bid  -Check A1c  -Avoid strict glycemic control in this elderly patient  -Plan of care is ongoing     3. Paroxysmal atrial fibrillation (CMS/HCC)/ On apixaban therapy/ Recurrent strokes (CMS/HCC)  -rate controlled  -BP acceptable on Amlodipine and hydrochlorothiazide  -check CBCD    4. MAX (generalized anxiety disorder)  - Patient denies feeling anxious  -Will trial of of Citalopram 10 mg daily by weaning off-  -Decrease dosage to 5 mg daily x 2 weeks , then discontinue   -Plan of care is ongoing          5. Anemia in chronic kidney disease, unspecified CKD stage  -check CBCD and CMP   -Continue Ferrous Sulfate/Vitamin C      6. Mass of lower inner quadrant of left breast  -has not increased in size  -Non-tender      7. Gastroesophageal reflux disease without esophagitis  -no issues  -Med was primarily started in the setting of GI bleeding (now resolved)   -will trial off of Omeprazole     8. Polypharmacy  -medication lists reviewed and adjusted     9. Neuropathy  -Patient does not recall  having this to feet  -Per medical record 1/5/2016 breast cancer w chemo in 2013 resulting in neuropathy in both feet.   -Will trial off of Gabapentin 100 mg -2 capsules at bedtime by weaning off-   -Give 100 mg at bedtime x 2 weeks then discontinue  -Plan of care is ongoing     10. Hyperlipidemia  -due to history of recurrent strokes, will continue high intensity statin  -Currently without and s/s of adverse effects  -Check lipid profile, CMP, TSH/Free T 4    ++ All labs have been requested for 2/13/2024    -Stable  -Routine follow up in 2-3 months

## 2024-01-05 NOTE — PROGRESS NOTES
Re from [facility] reporting that pt tested positive for covid today  She has had decreased appetite since Tuesday (3 days ago) so they tested her  No other s/s-no cough, fever or other uri s/s    They will make sure she drinks adequate fluids and will notify us if any change in condition    [id number] today  Asked nurses to check daily and let us know if >250    Did not start paxlovid because of drug interaction with eliquis and because her symptoms were so mild

## 2024-01-15 ENCOUNTER — APPOINTMENT (OUTPATIENT)
Dept: OPHTHALMOLOGY | Facility: CLINIC | Age: 89
End: 2024-01-15
Payer: MEDICARE

## 2024-02-02 ENCOUNTER — APPOINTMENT (OUTPATIENT)
Dept: OPHTHALMOLOGY | Facility: CLINIC | Age: 89
End: 2024-02-02
Payer: MEDICARE

## 2024-02-09 ENCOUNTER — OFFICE VISIT (OUTPATIENT)
Dept: NEUROLOGY | Facility: HOSPITAL | Age: 89
End: 2024-02-09
Payer: MEDICARE

## 2024-02-09 VITALS
DIASTOLIC BLOOD PRESSURE: 63 MMHG | SYSTOLIC BLOOD PRESSURE: 128 MMHG | BODY MASS INDEX: 23.04 KG/M2 | RESPIRATION RATE: 18 BRPM | HEIGHT: 63 IN | TEMPERATURE: 96.6 F | WEIGHT: 130 LBS | HEART RATE: 71 BPM

## 2024-02-09 DIAGNOSIS — I48.0 PAROXYSMAL ATRIAL FIBRILLATION (MULTI): ICD-10-CM

## 2024-02-09 DIAGNOSIS — Z86.73 HISTORY OF STROKE: ICD-10-CM

## 2024-02-09 DIAGNOSIS — H53.9 VISUAL CHANGES: ICD-10-CM

## 2024-02-09 DIAGNOSIS — G43.109 MIGRAINE WITH VISUAL AURA: Primary | ICD-10-CM

## 2024-02-09 DIAGNOSIS — H34.11 CENTRAL RETINAL ARTERY OCCLUSION OF RIGHT EYE: ICD-10-CM

## 2024-02-09 PROCEDURE — 3074F SYST BP LT 130 MM HG: CPT | Performed by: PSYCHIATRY & NEUROLOGY

## 2024-02-09 PROCEDURE — 99205 OFFICE O/P NEW HI 60 MIN: CPT | Performed by: PSYCHIATRY & NEUROLOGY

## 2024-02-09 PROCEDURE — 1036F TOBACCO NON-USER: CPT | Performed by: PSYCHIATRY & NEUROLOGY

## 2024-02-09 PROCEDURE — 99215 OFFICE O/P EST HI 40 MIN: CPT | Performed by: PSYCHIATRY & NEUROLOGY

## 2024-02-09 PROCEDURE — 3078F DIAST BP <80 MM HG: CPT | Performed by: PSYCHIATRY & NEUROLOGY

## 2024-02-09 PROCEDURE — 1159F MED LIST DOCD IN RCRD: CPT | Performed by: PSYCHIATRY & NEUROLOGY

## 2024-02-09 PROCEDURE — 1126F AMNT PAIN NOTED NONE PRSNT: CPT | Performed by: PSYCHIATRY & NEUROLOGY

## 2024-02-09 RX ORDER — PROPRANOLOL HYDROCHLORIDE 20 MG/1
20 TABLET ORAL 2 TIMES DAILY
COMMUNITY

## 2024-02-09 ASSESSMENT — PAIN SCALES - GENERAL: PAINLEVEL: 0-NO PAIN

## 2024-02-09 NOTE — PROGRESS NOTES
Provider's impression:  Ms. Zhou is a 89-year-old  RH woman with bachelors degree in education, PMHx LMCA stroke in 2020, afib on Eliquis, HTN, HLD, breast cancer with left lumpectomy and chemotherapy, radiation, migraine with visual aura, mitral valve prolapse who is presenting today with chief complaint of seeing intermittent flashes of lights bilaterally . She is referred by her ophthalmologist, Dr. Cohen for  flashes of light.  She reports intermittent flashes of light for a few years, occur intermittently about once weekly, no triggers, she was seen by Dr. Cohen on 10/1/2021 and diagnosed with posterior vitreous detachment, she also has known migraine with visual aura.    Given the history and today's evaluation, I suspect the flashes of lights possibly related to posterior vitreous detachment, differential diagnosis include migraine with aura or visual hallucinations. It is reassuring that they are intermittent for over three years now with no acute focal neurological deficits with no major changes in duration or quality. She is optimized for her stroke secondary prophylaxis.  I recommended to get MRI brain to rule out interval intracranial abnormalities and she prefers not to get this evaluation at this time. I suggested to try a preventative headache treatment but she prefers not to start a new medication, differential diagnosis include visual hallucinations although less likely.  We discussed the importance to remain physically and mentally active, fall prevention in details and recommended to continue physical therapy.    PLAN:    We discussed the importance of adequate control of vascular risk factors.   I didn't apply any changes to your medications.  We discussed strategies to remain physically and mentally active.  Continue follow-up With ophthalmology  Follow-up to be arranged.    Please call [phone] if you have any questions.     General brain health guidelines:  - make sure your other  "medical conditions are well controlled (e.g., high blood pressure, high cholesterol, diabetes, sleep apnea etc)  - do not smoke or chew tobacco  - address any sensory deficits (e.g., proper glasses for poor eyesight, hearing aids for hearing loss)  - use a weekly pill box  - eat a heart healthy diet (e.g., lots of fruits and vegetables, low fat and cholesterol)  - exercise at least four days per week, 30 minutes at a time at an intensity that would make it difficult to converse with someone   - make sure you are getting at least 7 hours of quality sleep per night  - keep yourself mentally active daily by reading, playing cards, doing word searches, puzzles, etc.  - stay socially active by being part of a group or organization     History of present illness:    Ms. Zhou is a 89-year-old  RH woman with bachelors degree in education, PMHx LMCA stroke in 2020, afib on Eliquis, HTN, HLD, breast cancer with left lumpectomy and chemotherapy, radiation, migraine with visual aura, mitral valve prolapse who is presenting today with chief complaint of seeing intermittent flashes of lights bilaterally . She is referred by her ophthalmologist, Dr. Cohen for  flashes of light, they started a few years ago and last for about 30 minutes, they occur about once weekly, no clear triggers, no alleviating factors. She is accompanied by her son, Dano who is healthcare POA.   Of note, she was seen by Dr. Cohen on 10/1/2021 for similar complaint and was diagnosed with bilateral posterior vitreous detachment.  She has been using wheelchair since 5/2021 after she had a fall and right femoral neck fracture and had right SISI.     She had a stroke(L MCA) in 09/2020 when she was living in the [facility], she moved to [facility] in [place] in 10/2020 and then moved to [place], [place].  She has residual right sided hemiparesis and dysarthria.    Mood is \"good.\". has not noticed any significant depressive symptoms. Gets frustrated with memory " "problems.   No excessive worry or anxiety.     Has good appetite - eats healthy.     No change in personality, social disinhibition, change in dietary preferences, loss of empathy, stereotyped or repetitive behaviors.     No fluctuating cognition, tremors, REM sleep behavior disorder, recent falls.     Functional changes:   Born and raised in [place]  Sleep: goes to bed at 10 pm and wakes up at 7 am  Current living situation - lives in Assisted living facility, [place], [place] .   Driving - doesn't drive.   Finances -Son helps managing finances.   Cooking - she doesn't cook.   ADLS - Se needs help with most ADLs.   Medications - given to her by nursing staff   Weapons at home: N/A  Knows 911? yes      Neuropsychiatric symptoms:   Depression - denies depression. Appetite ok. Sleeping fine. No worthlessness/helplessness. No suicidal thoughts, intent or plans.   Anxiety - Denies.   Psychosis - Denies.   Janene - Denies.   Suicidality - Denies.     Past Neuropsychiatric History:  Handedness: right.   History of traumatic brain injury: None.   History of seizures: None  History of stroke: None.   Past psychiatric history: None    PMH/PSH:  As above, in addition    Meds: reviewed as listed    Allergies: reviewed as listed    Family history:   Psychiatric: None.   Dementia: None   Parkinsons disease: None  Huntingtons disease: None  ALS: None    Mental Status Examination:  General appearance: Well-groomed, good eye contact, cooperative  Orientation: Alert and oriented to person, place, and time  Motor: no psychomotor agitation or retardation.  Speech: hesitant, slow residual expressive aphasia with mild dysarthria  Mood: \"good\"  Affect: stable, full range, with brightening, and mood congruent  Passive death wish: denies  Suicidal ideation: denies  Thought process: logical, linear, and goal-directed without loosening of associations  Thought content: no hallucinations, delusions, and not responding to internal " stimuli  Insight/Judgment: good/good  Praxis: Transitive/Intransitive/Orofacial  Fund of knowledge: good  Recent and remote memory: good  Attention span and concentration: fair  Language: normal receptive and impaired expressive language with paraphrasic errors.    NEUROLOGICAL EXAMINATION.    Cranial nerves:  CN II: visual fields full to confrontation  CN III, IV, VI: Pupils round, reactive to light and accommodation.  Lids symmetric.  No ptosis.  Extra-occular muscles are intact with normal alignment.  No nystagmus  CN V: Facial sensation intact bilaterally.    CN VII: Normal and symmetric facial strength.  Nasolabial folds symmetric  CN VIII: Hearing intact to finger rub  CN IX: Palate elevates symmetrically.  CN XI: Normal shoulder shrug and neck turning  CN XII: Tongue midline, with normal bulk and strength, no fasciculations        Motor:  Muscle bulk was normal in all extremities.  5/5 strength in left UE and LE extremities, 4/5 in right UE and LE  Normal finger taps and hand opening on left side, slow on the right side  Normal tone on the left side, mild spasticity on the right side  No abnormal movements    Reflexes:   Right UE     LEFT UE  BR: 2+          BR: 2  Biceps: 2+   Biceps: 2  Triceps: 2+   Triceps: 2    RIGHT LE     LEFT LE  Knee: 2+        Knee: 2  Ankle: 2       Ankle: 2    Negative Bailey's reflex  No frontal release signs    Coordination:  Normal finger to nose testing and rapid alternating movements    Gait:  Deferred due to patient's safety    Sensory:  Intact to light touch bilaterally        ROS: All systems reviewed, pertinent positives noted in HPI

## 2024-02-09 NOTE — PATIENT INSTRUCTIONS
You were seen today by Dr. Pfeiffer.  It is a pleasure seeing you.    Given the history and today's evaluation, I suspect the flashes of lights possibly related to posterior vitreous detachment, differential diagnosis include migraine with aura or visual hallucinations. It is reassuring that they are intermittent for over three years now with no acute focal neurological deficits with no major changes in duration or quality.  I recommended to get MRI brain and you prefer not to get this evaluation at this time.  We discussed the importance to remain physically and mentally active.    PLAN:    We discussed the importance of adequate control of vascular risk factors.   I didn't apply any changes to your medications.  We discussed strategies to remain physically and mentally active.  Continue follow-up With ophthalmology  Follow-up to be arranged.    Please call [phone] if you have any questions.     General brain health guidelines:  - make sure your other medical conditions are well controlled (e.g., high blood pressure, high cholesterol, diabetes, sleep apnea etc)  - do not smoke or chew tobacco  - address any sensory deficits (e.g., proper glasses for poor eyesight, hearing aids for hearing loss)  - use a weekly pill box  - eat a heart healthy diet (e.g., lots of fruits and vegetables, low fat and cholesterol)  - exercise at least four days per week, 30 minutes at a time at an intensity that would make it difficult to converse with someone   - make sure you are getting at least 7 hours of quality sleep per night  - keep yourself mentally active daily by reading, playing cards, doing word searches, puzzles, etc.  - stay socially active by being part of a group or organization

## 2024-02-22 ENCOUNTER — TELEPHONE (OUTPATIENT)
Dept: GERIATRIC MEDICINE | Facility: CLINIC | Age: 89
End: 2024-02-22
Payer: MEDICARE

## 2024-02-27 NOTE — TELEPHONE ENCOUNTER
Rashmi RN is asking for standing straight orders for miralax or colace to promote bowel regime.     Paula FIGUEROA [phone]  Pratima Rodriguez7 hours ago (12:04 PM)     Pratima Vega4 days ago       RN from [facility] phoned.  Asking for standing orders for bowel treatment (currently prn).  Per caller pt is frequently using prn me       Assessment/Plan  Discussed with nurse that these meds were originally changed to prn due to patient was c/o diarrhea.    Start Colace 100 bid schedule for now  Keep Miralax and Senna prn  If need to add , will do.   DB

## 2024-03-07 ENCOUNTER — HOME VISIT (OUTPATIENT)
Dept: GERIATRIC MEDICINE | Facility: CLINIC | Age: 89
End: 2024-03-07
Payer: MEDICARE

## 2024-03-07 DIAGNOSIS — H91.93 BILATERAL HEARING LOSS, UNSPECIFIED HEARING LOSS TYPE: ICD-10-CM

## 2024-03-07 DIAGNOSIS — K59.00 CONSTIPATION, UNSPECIFIED CONSTIPATION TYPE: Primary | ICD-10-CM

## 2024-03-07 DIAGNOSIS — H61.20 IMPACTED CERUMEN, UNSPECIFIED LATERALITY: ICD-10-CM

## 2024-03-07 DIAGNOSIS — R63.0 DECREASED APPETITE: ICD-10-CM

## 2024-03-07 PROCEDURE — 99349 HOME/RES VST EST MOD MDM 40: CPT | Performed by: NURSE PRACTITIONER

## 2024-03-07 ASSESSMENT — PAIN SCALES - GENERAL: PAINLEVEL: 0-NO PAIN

## 2024-03-19 ENCOUNTER — CLINICAL SUPPORT (OUTPATIENT)
Dept: EMERGENCY MEDICINE | Facility: HOSPITAL | Age: 89
End: 2024-03-19
Payer: MEDICARE

## 2024-03-19 ENCOUNTER — APPOINTMENT (OUTPATIENT)
Dept: RADIOLOGY | Facility: HOSPITAL | Age: 89
End: 2024-03-19
Payer: MEDICARE

## 2024-03-19 ENCOUNTER — HOSPITAL ENCOUNTER (OUTPATIENT)
Facility: HOSPITAL | Age: 89
Setting detail: OBSERVATION
Discharge: HOME | End: 2024-03-20
Attending: EMERGENCY MEDICINE | Admitting: PHYSICIAN ASSISTANT
Payer: MEDICARE

## 2024-03-19 DIAGNOSIS — R10.84 GENERALIZED ABDOMINAL PAIN: Primary | ICD-10-CM

## 2024-03-19 LAB
ALBUMIN SERPL BCP-MCNC: 4.1 G/DL (ref 3.4–5)
ALP SERPL-CCNC: 64 U/L (ref 33–136)
ALT SERPL W P-5'-P-CCNC: 20 U/L (ref 7–45)
ANION GAP SERPL CALC-SCNC: 15 MMOL/L (ref 10–20)
APPEARANCE UR: CLEAR
AST SERPL W P-5'-P-CCNC: 15 U/L (ref 9–39)
ATRIAL RATE: 82 BPM
BASOPHILS # BLD AUTO: 0.05 X10*3/UL (ref 0–0.1)
BASOPHILS NFR BLD AUTO: 0.4 %
BILIRUB SERPL-MCNC: 0.7 MG/DL (ref 0–1.2)
BILIRUB UR STRIP.AUTO-MCNC: NEGATIVE MG/DL
BUN SERPL-MCNC: 23 MG/DL (ref 6–23)
CALCIUM SERPL-MCNC: 10.1 MG/DL (ref 8.6–10.6)
CHLORIDE SERPL-SCNC: 100 MMOL/L (ref 98–107)
CO2 SERPL-SCNC: 27 MMOL/L (ref 21–32)
COLOR UR: ABNORMAL
CREAT SERPL-MCNC: 1.06 MG/DL (ref 0.5–1.05)
EGFRCR SERPLBLD CKD-EPI 2021: 50 ML/MIN/1.73M*2
EOSINOPHIL # BLD AUTO: 0.09 X10*3/UL (ref 0–0.4)
EOSINOPHIL NFR BLD AUTO: 0.8 %
ERYTHROCYTE [DISTWIDTH] IN BLOOD BY AUTOMATED COUNT: 14.2 % (ref 11.5–14.5)
GLUCOSE SERPL-MCNC: 187 MG/DL (ref 74–99)
GLUCOSE UR STRIP.AUTO-MCNC: NORMAL MG/DL
HCT VFR BLD AUTO: 40.3 % (ref 36–46)
HGB BLD-MCNC: 13.3 G/DL (ref 12–16)
IMM GRANULOCYTES # BLD AUTO: 0.06 X10*3/UL (ref 0–0.5)
IMM GRANULOCYTES NFR BLD AUTO: 0.5 % (ref 0–0.9)
INR PPP: 0.9 (ref 0.9–1.1)
KETONES UR STRIP.AUTO-MCNC: NEGATIVE MG/DL
LEUKOCYTE ESTERASE UR QL STRIP.AUTO: NEGATIVE
LYMPHOCYTES # BLD AUTO: 1.19 X10*3/UL (ref 0.8–3)
LYMPHOCYTES NFR BLD AUTO: 10.2 %
MAGNESIUM SERPL-MCNC: 1.86 MG/DL (ref 1.6–2.4)
MCH RBC QN AUTO: 29.3 PG (ref 26–34)
MCHC RBC AUTO-ENTMCNC: 33 G/DL (ref 32–36)
MCV RBC AUTO: 89 FL (ref 80–100)
MONOCYTES # BLD AUTO: 0.91 X10*3/UL (ref 0.05–0.8)
MONOCYTES NFR BLD AUTO: 7.8 %
MUCOUS THREADS #/AREA URNS AUTO: NORMAL /LPF
NEUTROPHILS # BLD AUTO: 9.4 X10*3/UL (ref 1.6–5.5)
NEUTROPHILS NFR BLD AUTO: 80.3 %
NITRITE UR QL STRIP.AUTO: NEGATIVE
NRBC BLD-RTO: 0 /100 WBCS (ref 0–0)
P AXIS: 72 DEGREES
P OFFSET: 195 MS
P ONSET: 141 MS
PH UR STRIP.AUTO: 7 [PH]
PHOSPHATE SERPL-MCNC: 3.2 MG/DL (ref 2.5–4.9)
PLATELET # BLD AUTO: 243 X10*3/UL (ref 150–450)
POTASSIUM SERPL-SCNC: 4.2 MMOL/L (ref 3.5–5.3)
PR INTERVAL: 170 MS
PROT SERPL-MCNC: 7.2 G/DL (ref 6.4–8.2)
PROT UR STRIP.AUTO-MCNC: ABNORMAL MG/DL
PROTHROMBIN TIME: 10.4 SECONDS (ref 9.8–12.8)
Q ONSET: 226 MS
QRS COUNT: 13 BEATS
QRS DURATION: 72 MS
QT INTERVAL: 392 MS
QTC CALCULATION(BAZETT): 457 MS
QTC FREDERICIA: 435 MS
R AXIS: -31 DEGREES
RBC # BLD AUTO: 4.54 X10*6/UL (ref 4–5.2)
RBC # UR STRIP.AUTO: NEGATIVE /UL
RBC #/AREA URNS AUTO: NORMAL /HPF
SODIUM SERPL-SCNC: 138 MMOL/L (ref 136–145)
SP GR UR STRIP.AUTO: 1.03
T AXIS: 39 DEGREES
T OFFSET: 422 MS
UROBILINOGEN UR STRIP.AUTO-MCNC: NORMAL MG/DL
VENTRICULAR RATE: 82 BPM
WBC # BLD AUTO: 11.7 X10*3/UL (ref 4.4–11.3)
WBC #/AREA URNS AUTO: NORMAL /HPF

## 2024-03-19 PROCEDURE — 2500000004 HC RX 250 GENERAL PHARMACY W/ HCPCS (ALT 636 FOR OP/ED): Performed by: STUDENT IN AN ORGANIZED HEALTH CARE EDUCATION/TRAINING PROGRAM

## 2024-03-19 PROCEDURE — 85025 COMPLETE CBC W/AUTO DIFF WBC: CPT | Performed by: STUDENT IN AN ORGANIZED HEALTH CARE EDUCATION/TRAINING PROGRAM

## 2024-03-19 PROCEDURE — 80053 COMPREHEN METABOLIC PANEL: CPT | Performed by: STUDENT IN AN ORGANIZED HEALTH CARE EDUCATION/TRAINING PROGRAM

## 2024-03-19 PROCEDURE — 85610 PROTHROMBIN TIME: CPT | Performed by: STUDENT IN AN ORGANIZED HEALTH CARE EDUCATION/TRAINING PROGRAM

## 2024-03-19 PROCEDURE — 99285 EMERGENCY DEPT VISIT HI MDM: CPT | Performed by: EMERGENCY MEDICINE

## 2024-03-19 PROCEDURE — 84100 ASSAY OF PHOSPHORUS: CPT | Performed by: STUDENT IN AN ORGANIZED HEALTH CARE EDUCATION/TRAINING PROGRAM

## 2024-03-19 PROCEDURE — 74177 CT ABD & PELVIS W/CONTRAST: CPT | Mod: FOREIGN READ | Performed by: RADIOLOGY

## 2024-03-19 PROCEDURE — 81001 URINALYSIS AUTO W/SCOPE: CPT | Performed by: STUDENT IN AN ORGANIZED HEALTH CARE EDUCATION/TRAINING PROGRAM

## 2024-03-19 PROCEDURE — 96374 THER/PROPH/DIAG INJ IV PUSH: CPT | Mod: 59 | Performed by: PHYSICIAN ASSISTANT

## 2024-03-19 PROCEDURE — 74177 CT ABD & PELVIS W/CONTRAST: CPT

## 2024-03-19 PROCEDURE — 93005 ELECTROCARDIOGRAM TRACING: CPT

## 2024-03-19 PROCEDURE — 83735 ASSAY OF MAGNESIUM: CPT | Performed by: STUDENT IN AN ORGANIZED HEALTH CARE EDUCATION/TRAINING PROGRAM

## 2024-03-19 PROCEDURE — 2500000004 HC RX 250 GENERAL PHARMACY W/ HCPCS (ALT 636 FOR OP/ED): Performed by: EMERGENCY MEDICINE

## 2024-03-19 PROCEDURE — 96375 TX/PRO/DX INJ NEW DRUG ADDON: CPT | Performed by: PHYSICIAN ASSISTANT

## 2024-03-19 PROCEDURE — 2550000001 HC RX 255 CONTRASTS: Performed by: EMERGENCY MEDICINE

## 2024-03-19 PROCEDURE — 36415 COLL VENOUS BLD VENIPUNCTURE: CPT | Performed by: STUDENT IN AN ORGANIZED HEALTH CARE EDUCATION/TRAINING PROGRAM

## 2024-03-19 PROCEDURE — 99285 EMERGENCY DEPT VISIT HI MDM: CPT | Mod: 25

## 2024-03-19 RX ORDER — ONDANSETRON HYDROCHLORIDE 2 MG/ML
4 INJECTION, SOLUTION INTRAVENOUS ONCE
Status: COMPLETED | OUTPATIENT
Start: 2024-03-19 | End: 2024-03-19

## 2024-03-19 RX ORDER — KETOROLAC TROMETHAMINE 15 MG/ML
15 INJECTION, SOLUTION INTRAMUSCULAR; INTRAVENOUS ONCE
Status: COMPLETED | OUTPATIENT
Start: 2024-03-19 | End: 2024-03-19

## 2024-03-19 RX ORDER — MORPHINE SULFATE 4 MG/ML
2 INJECTION INTRAVENOUS ONCE
Status: COMPLETED | OUTPATIENT
Start: 2024-03-19 | End: 2024-03-19

## 2024-03-19 RX ADMIN — KETOROLAC TROMETHAMINE 15 MG: 15 INJECTION, SOLUTION INTRAMUSCULAR; INTRAVENOUS at 19:31

## 2024-03-19 RX ADMIN — MORPHINE SULFATE 2 MG: 4 INJECTION, SOLUTION INTRAMUSCULAR; INTRAVENOUS at 19:31

## 2024-03-19 RX ADMIN — IOHEXOL 75 ML: 350 INJECTION, SOLUTION INTRAVENOUS at 20:18

## 2024-03-19 RX ADMIN — ONDANSETRON 4 MG: 2 INJECTION INTRAMUSCULAR; INTRAVENOUS at 18:14

## 2024-03-19 ASSESSMENT — PAIN DESCRIPTION - LOCATION
LOCATION: ABDOMEN
LOCATION: ABDOMEN

## 2024-03-19 ASSESSMENT — COLUMBIA-SUICIDE SEVERITY RATING SCALE - C-SSRS
1. IN THE PAST MONTH, HAVE YOU WISHED YOU WERE DEAD OR WISHED YOU COULD GO TO SLEEP AND NOT WAKE UP?: NO
2. HAVE YOU ACTUALLY HAD ANY THOUGHTS OF KILLING YOURSELF?: NO
6. HAVE YOU EVER DONE ANYTHING, STARTED TO DO ANYTHING, OR PREPARED TO DO ANYTHING TO END YOUR LIFE?: NO

## 2024-03-19 ASSESSMENT — PAIN DESCRIPTION - ORIENTATION
ORIENTATION: MID
ORIENTATION: MID

## 2024-03-19 ASSESSMENT — PAIN SCALES - GENERAL
PAINLEVEL_OUTOF10: 2
PAINLEVEL_OUTOF10: 8

## 2024-03-19 ASSESSMENT — PAIN - FUNCTIONAL ASSESSMENT: PAIN_FUNCTIONAL_ASSESSMENT: 0-10

## 2024-03-19 NOTE — ED PROVIDER NOTES
CC: Abdominal Pain     HPI:  Courtney Zhou is a 89 y.o. female  With a past medical history of left MCA stroke in 2020, A-fib on Eliquis, HTN, HLD, breast cancer status postchemotherapy and radiation, mitral valve prolapse, presenting to the emergency department today due to abdominal pain.  Patient states that her abdominal pain started this morning.  She has not been able to tolerate much food.  She threw up her lunch.  She has not eaten or drink anything since she threw up her lunch.  She does state that she is having some decreased bowel movements and decrease in flatulence today.  She describes the pain to be more in her suprapubic area.  She denies any pain with urination.  She denies any fevers, chills, or other symptoms at this time.    Limitations to History: none  Additional History provided by: N/A    External Records Reviewed:  Recent available ED and inpatient notes reviewed in EMR.  Reviewed outpatient note from 2/9/2024    PMHx/PSHx:  Per HPI.   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  - has a past surgical history that includes Other surgical history (10/22/2020).    Medications:  Reviewed in EMR. See EMR for complete list of medications and doses.    Allergies:  Adhesive tape-silicones, Codeine, Hydralazine, Pneumococcal 23-tahmina ps vaccine, Prazosin, Ramipril, Sulfa (sulfonamide antibiotics), and Sulfamethoxazole    Social History:  - Tobacco:  reports that she has never smoked. She has never used smokeless tobacco.   - Alcohol:  has no history on file for alcohol use.   - Illicit Drugs:  has no history on file for drug use.     ROS:  Per HPI.     ???????????????????????????????????????????????????????????????  Triage Vitals:  T 37.1 °C (98.8 °F)  HR 77  /76  RR 18  O2 99 % (RA) None (Room air)    Physical Exam  Vitals and  nursing note reviewed.   Constitutional:       General: She is not in acute distress.     Appearance: She is well-developed.   HENT:      Head: Normocephalic and atraumatic.   Eyes:      Conjunctiva/sclera: Conjunctivae normal.   Cardiovascular:      Rate and Rhythm: Normal rate and regular rhythm.      Heart sounds: Murmur heard.   Pulmonary:      Effort: Pulmonary effort is normal. No respiratory distress.      Breath sounds: Normal breath sounds.   Abdominal:      Palpations: Abdomen is soft.      Tenderness: There is abdominal tenderness in the suprapubic area. There is no guarding or rebound.   Musculoskeletal:         General: No swelling.      Cervical back: Neck supple.   Skin:     General: Skin is warm and dry.      Capillary Refill: Capillary refill takes less than 2 seconds.   Neurological:      Mental Status: She is alert and oriented to person, place, and time.      Cranial Nerves: No facial asymmetry.      Sensory: No sensory deficit.      Motor: No weakness.   Psychiatric:         Mood and Affect: Mood normal.       ???????????????????????????????????????????????????????????????  ED Course:  ED Course as of 03/21/24 1309   Tue Mar 19, 2024   1823 EKG shows a normal rate and rhythm, leftward axis, normal intervals. And normal ST and T wave pattern with no evidence of acute ischemia or other acute findings [SC]   Wed Mar 20, 2024   0502 Patient signed out to me pending vascular surgery recommendations.  Vascular surgery came down and evaluated patient.  I did have discussion regarding outpatient elective emergent surgery which patient did not reportedly want.  Otherwise cleared for discharge from vascular surgery standpoint.  Given this, we will plan to admit patient to the hospital for serial abdominal dams to CDU.  CDU providers accepted patient [MJ]      ED Course User Index  [MJ] Riaz Boudreaux DO  [SC] Dinah Patricio MD         Diagnoses as of 03/21/24 1309   Generalized abdominal pain        EKG & Images:  Independently reviewed, See ED Course      MDM:  - The pt is a 89 yo with a PMH of stroke, Afib on eliquis, known AAA, mitral valve prolapse, presenting due to abdominal pain, nausea, vomiting that started today. Abdominal exam is over all unremarkable. Differential includes constipation, UTI, intrabdominal process, progression of her AAA. EKG was unremarkable. Labwork was unremarkable. CT showed enlargement of her AAA from 4.5  to 4.7x5.7. As such vascular surgery was consulted. Pt was unable to make urine despite distended bladder on CT. As such, will place straight cath. Urine did not show signs of UTI. At approximately 2300 was signed out pending vascular surgery recommendations. Please see their note for further details.     Final diagnoses:   [R10.84] Generalized abdominal pain         Social Determinants Limiting Care:  None identified    Disposition:  Handoff    Dinah Patricio MD   Emergency Medicine Resident, PGY3  [facility]     Disclaimer: This note was dictated by speech recognition. Minor errors in transcription may be present    Procedures ? [facility] last updated 3/21/2024 1:09 PM     Dinah Patricio MD  Resident  03/21/24 1309  --------------------------------------    This patient was seen by the resident physician. I have seen and examined the patient, agree with the workup, evaluation, management and diagnosis. The care plan has been discussed and I concur.    My assessment reveals the following:    HPI:  Patient is a 89 y/o female with h/o CVA, Afib on Eliquis, HTN, HLD, breast cancer in remission, cholecystectomy, GURPREET ,and AAA that is being monitoring presenting with acute onset of periumbilical abd pain without radiation that began this AM. Constant since onset. Described as achy. No radiation of pain. Decreased PO intake. No urinary complaints. Reports h/o constipation that has worsened over past 2-3 months, treated with  suppositories with relief. Reports decreased flatus as well. 10/10 severity.     Additional History Obtained from: Son and granddaughter at bedside.    PE:  Vital signs reviewed in nursing triage note, EMR flowsheets, and at patient's bedside  GEN: Patient is awake, alert, calm, cooperative, and in mild painful distress.  HEAD: Normocephalic and atraumatic.  EYES: Anicteric sclera.  MOUTH: Mucous membranes moist.  CV: Regular rate and rhythm. (+) s1/s2. No murmurs/rubs/gallops.  PULM: CTAB. No wheezes, rales, or crackles.  GI: Soft, mild diffuse TTP, non-distended without rebound or guarding.  EXT: No deformities noted.   NEURO: Moves all extremities.  SKIN: Warm, dry. No erythema or ecchymosis.    Labs Reviewed   CBC WITH AUTO DIFFERENTIAL - Abnormal       Result Value    WBC 11.7 (*)     nRBC 0.0      RBC 4.54      Hemoglobin 13.3      Hematocrit 40.3      MCV 89      MCH 29.3      MCHC 33.0      RDW 14.2      Platelets 243      Neutrophils % 80.3      Immature Granulocytes %, Automated 0.5      Lymphocytes % 10.2      Monocytes % 7.8      Eosinophils % 0.8      Basophils % 0.4      Neutrophils Absolute 9.40 (*)     Immature Granulocytes Absolute, Automated 0.06      Lymphocytes Absolute 1.19      Monocytes Absolute 0.91 (*)     Eosinophils Absolute 0.09      Basophils Absolute 0.05     COMPREHENSIVE METABOLIC PANEL - Abnormal    Glucose 187 (*)     Sodium 138      Potassium 4.2      Chloride 100      Bicarbonate 27      Anion Gap 15      Urea Nitrogen 23      Creatinine 1.06 (*)     eGFR 50 (*)     Calcium 10.1      Albumin 4.1      Alkaline Phosphatase 64      Total Protein 7.2      AST 15      Bilirubin, Total 0.7      ALT 20     URINALYSIS WITH REFLEX CULTURE AND MICROSCOPIC - Abnormal    Color, Urine Light-Yellow      Appearance, Urine Clear      Specific Gravity, Urine 1.027      pH, Urine 7.0      Protein, Urine 70 (1+) (*)     Glucose, Urine Normal      Blood, Urine NEGATIVE      Ketones, Urine NEGATIVE       Bilirubin, Urine NEGATIVE      Urobilinogen, Urine Normal      Nitrite, Urine NEGATIVE      Leukocyte Esterase, Urine NEGATIVE     MAGNESIUM - Normal    Magnesium 1.86     PHOSPHORUS - Normal    Phosphorus 3.2     PROTIME-INR - Normal    Protime 10.4      INR 0.9     URINALYSIS WITH REFLEX CULTURE AND MICROSCOPIC    Narrative:     The following orders were created for panel order Urinalysis with Reflex Culture and Microscopic.  Procedure                               Abnormality         Status                     ---------                               -----------         ------                     Urinalysis with Reflex C...[[id number]]  Abnormal            Final result               Extra Urine Gray Tube[[id number]]                            Final result                 Please view results for these tests on the individual orders.   EXTRA URINE GRAY TUBE    Extra Tube Hold for add-ons.     URINALYSIS MICROSCOPIC WITH REFLEX CULTURE    WBC, Urine 1-5      RBC, Urine 1-2      Mucus, Urine FEW       CT abdomen pelvis w IV contrast   Final Result   1.  Stable 7 mm cystic lesion within the body the pancreas described   above.  This could represent possible I PMN, simple cyst, pseudocyst.   2.  Colonic diverticulosis without definite CT evidence of   diverticulitis.   3.  There is fluid noted within the colon which would be consistent   with a history of diarrhea.   4.  No definite bowel thickening or dilatation to suggest mechanical   obstruction.   5.  There is a 5.7 x 4.7 cm infrarenal abdominal aortic aneurysm which   is increased in size compared to the prior exam.  No definite evidence   of leak or rupture.   6.  No intra-abdominal/pelvic fluid collections or pneumoperitoneum..   Signed by Abbe Bullock MD            Medical Decision Making:  - Monitor  - IV  - Lab  - Pain meds  - Nausea meds   - CT abd/pelvis with IV contrast  - Vascular surgery consult.     EKG: EKG independently reviewed by the attending ED  physician, and I and concur with the resident's/advanced practice provider's interpretation. Sinus rhythm at 82 bpm with PAC, LAD, normal intervals, no ST or T wave changes. Similar to old EKG on 5/23/21.    Differential Diagnoses Considered: SBO, Colitis, Appendicitis    Chronic Medical Conditions Significantly Affecting Care: See HPI    MD Zana Galarza MD  03/22/24 0002

## 2024-03-20 VITALS
OXYGEN SATURATION: 100 % | SYSTOLIC BLOOD PRESSURE: 132 MMHG | HEIGHT: 65 IN | BODY MASS INDEX: 22.04 KG/M2 | HEART RATE: 80 BPM | WEIGHT: 132.28 LBS | TEMPERATURE: 98.8 F | RESPIRATION RATE: 18 BRPM | DIASTOLIC BLOOD PRESSURE: 88 MMHG

## 2024-03-20 PROBLEM — I71.40 AAA (ABDOMINAL AORTIC ANEURYSM) WITHOUT RUPTURE (CMS-HCC): Status: ACTIVE | Noted: 2024-03-20

## 2024-03-20 PROBLEM — R10.9 ABDOMINAL PAIN: Status: ACTIVE | Noted: 2024-03-20

## 2024-03-20 LAB — HOLD SPECIMEN: NORMAL

## 2024-03-20 PROCEDURE — G0378 HOSPITAL OBSERVATION PER HR: HCPCS

## 2024-03-20 PROCEDURE — 2500000001 HC RX 250 WO HCPCS SELF ADMINISTERED DRUGS (ALT 637 FOR MEDICARE OP): Performed by: PHYSICIAN ASSISTANT

## 2024-03-20 PROCEDURE — 2500000002 HC RX 250 W HCPCS SELF ADMINISTERED DRUGS (ALT 637 FOR MEDICARE OP, ALT 636 FOR OP/ED): Performed by: PHYSICIAN ASSISTANT

## 2024-03-20 RX ORDER — AMLODIPINE BESYLATE 5 MG/1
5 TABLET ORAL 2 TIMES DAILY
Status: DISCONTINUED | OUTPATIENT
Start: 2024-03-20 | End: 2024-03-20 | Stop reason: HOSPADM

## 2024-03-20 RX ORDER — GABAPENTIN 100 MG/1
200 CAPSULE ORAL NIGHTLY
Status: DISCONTINUED | OUTPATIENT
Start: 2024-03-20 | End: 2024-03-20 | Stop reason: HOSPADM

## 2024-03-20 RX ORDER — DICYCLOMINE HYDROCHLORIDE 20 MG/1
20 TABLET ORAL 4 TIMES DAILY PRN
Qty: 20 TABLET | Refills: 0 | Status: SHIPPED | OUTPATIENT
Start: 2024-03-20 | End: 2024-03-20 | Stop reason: ENTERED-IN-ERROR

## 2024-03-20 RX ORDER — ACETAMINOPHEN 325 MG/1
650 TABLET ORAL EVERY 6 HOURS PRN
Qty: 30 TABLET | Refills: 0 | Status: SHIPPED | OUTPATIENT
Start: 2024-03-20

## 2024-03-20 RX ORDER — ONDANSETRON 4 MG/1
4 TABLET, ORALLY DISINTEGRATING ORAL EVERY 8 HOURS PRN
Qty: 15 TABLET | Refills: 0 | Status: SHIPPED | OUTPATIENT
Start: 2024-03-20 | End: 2024-03-25

## 2024-03-20 RX ORDER — PROPRANOLOL HYDROCHLORIDE 20 MG/1
20 TABLET ORAL 2 TIMES DAILY
Status: DISCONTINUED | OUTPATIENT
Start: 2024-03-20 | End: 2024-03-20 | Stop reason: HOSPADM

## 2024-03-20 RX ORDER — MONTELUKAST SODIUM 10 MG/1
10 TABLET ORAL NIGHTLY
Status: DISCONTINUED | OUTPATIENT
Start: 2024-03-20 | End: 2024-03-20 | Stop reason: HOSPADM

## 2024-03-20 RX ORDER — HYDROCHLOROTHIAZIDE 25 MG/1
12.5 TABLET ORAL DAILY
Status: DISCONTINUED | OUTPATIENT
Start: 2024-03-20 | End: 2024-03-20 | Stop reason: HOSPADM

## 2024-03-20 RX ADMIN — HYDROCHLOROTHIAZIDE 12.5 MG: 25 TABLET ORAL at 09:26

## 2024-03-20 RX ADMIN — AMLODIPINE BESYLATE 5 MG: 5 TABLET ORAL at 09:26

## 2024-03-20 RX ADMIN — APIXABAN 2.5 MG: 2.5 TABLET, FILM COATED ORAL at 09:20

## 2024-03-20 RX ADMIN — MONTELUKAST 10 MG: 10 TABLET, FILM COATED ORAL at 03:22

## 2024-03-20 RX ADMIN — GABAPENTIN 200 MG: 100 CAPSULE ORAL at 03:22

## 2024-03-20 RX ADMIN — PROPRANOLOL HYDROCHLORIDE 20 MG: 20 TABLET ORAL at 09:20

## 2024-03-20 NOTE — H&P
Vascular Surgery Note    Assessment/Plan   Pt is a 89 y.o. female with hx of known infrarenal abdominal aortic aneurysm who presents with 1 day of infra-umbilical abdominal pain, that is now resolved with morphine in the ED. Not concerned for rupture of aneurysm. Her aneurysmal growth of 1 cm/2 years does not warrant repair but the current size of 5.7 cm does prompt discussion of elective repair. Difficult to determine whether her abdominal pain on presentation was caused by the AAA.     Risks and benefits of open and endovascular repair of infrarenal abdominal aortic aneurysm discussed, including elective and emergent treatment, recovery, possible postoperative complications. Pt communicated that she does not want elective or emergent repair. Should she change her mind, she can contact our vascular surgery office.     Recommendations  - pain control can be managed by PCP       Seen with chief resident and discussed with Attending, Dr. Mota.     Beck Dave MD  Pager [phone]     _______________________________________________________________________________________________________________________________________________________________________________________________________________    History Of Present Illness  Pt is a 89 y.o. female with hx of known infrarenal abdominal aortic aneurysm that has now grown in size by 1 cm over the last two years who now presents with 1 day of abdominal pain.     Limited trauma activation 2022 for mechanical fall with head strike, blood thinners, no LOC. CT showed 4.5 cm infrarenal abdominal aortic aneurysm.     Known aneurysm for the last 10 years - opted for surveillance    This morning, she woke up with pain in abdomen, infra-umbilical that was constant. Emesis x1 after lunch. Denies fevers, chills. Not passing gas but BM yesterday. Has voided multiple times since then. In ED, afebrile, not tachycardic, without hypotension. Leukocytosis to 11.7. After receiving morphine in ED, pain  is relieved. Straight cath was 450cc.     CT showed 5.7 cm AAA including 1.5 cm mural thrombus. 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  These latter two aneurysms are stable compared to prior imaging.     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  Meds -   Current Outpatient Medications   Medication Instructions    acetaminophen (Tylenol) 500 mg tablet 1 tablet, oral, As needed    amLODIPine (Norvasc) 5 mg tablet 1 tablet, oral, 2 times daily    apixaban (ELIQUIS) 2.5 mg, oral, 2 times daily    ascorbic acid (VITAMIN C) 500 mg, oral, Daily    atorvastatin (LIPITOR) 80 mg, oral, Nightly    bisacodyl (DULCOLAX) 10 mg, rectal, Daily PRN    calcium carbonate-vitamin D3 500 mg-5 mcg (200 unit) tablet Calcium 500 + D 500-200 MG-UNIT TABS   Refills: 0        Start : 2021   Active    cholecalciferol (Vitamin D-3) 25 MCG (1000 UT) capsule 1000 unit(s) orally once a day    clobetasol (Temovate) 0.05 % cream     diclofenac sodium 1 % kit APPLY 4 GRAMS TOPICALLY TO AFFECTED AREAS TWICE A DAY AS NEEDED    docusate sodium (Colace) 100 mg capsule 1 capsule, oral, 2 times daily    ferrous sulfate 325 (65 Fe) MG EC tablet 1 tablet, oral, Daily, Do not crush, chew, or split.    fluticasone propion-salmeteroL (Advair Diskus) 250-50 mcg/dose diskus inhaler     gabapentin (Neurontin) 100 mg capsule 2 capsules, oral, Nightly    hydroCHLOROthiazide (Microzide) 12.5 mg capsule 1 capsule, oral, Daily, for EDEMA    hydrocortisone (Proctocort) 10 % (80 mg) rectal foam 1 applicator, rectal, Nightly    hydrocortisone 2.5 % cream Hydrocortisone 2.5 % External Cream   Refills: 0        Start : 2021  "  Active    lidocaine (Lidoderm) 5 % patch topically    loperamide (Imodium) 2 mg capsule     melatonin tablet 1 tablet, oral, Nightly    montelukast (Singulair) 10 mg tablet 1 tablet, oral, Nightly    NON FORMULARY Labs HgbA1c , CMP,  Lipid Profile, and TSH \" DM II\"  /CBC \"anticoagulation therapy\"    polyethylene glycol (Miralax) 17 gram/dose powder MiraLax 17 GM Oral Packet   Refills: 0        Start : 23-Nov-2021   Active    potassium chloride CR 20 mEq ER tablet 1 tablet, oral, Daily    predniSONE (DELTASONE) 2.5 mg, oral, 2 times daily with meals    propranolol (INDERAL) 20 mg, oral, 2 times daily    sennosides (Senokot) 8.6 mg tablet 1 tablet, oral, Nightly PRN        Allergies - none  Social hx - lives in assisted living and uses wheelchair since the trauma 2 years ago; active socially (bingo, etc) needs help bathing and getting dressed     Objective  Vitals:    03/1935   BP: 179/90   Pulse: 87   Resp: 18   Temp:    SpO2: 96%        Physical Exam     Constitutional- no acute distress  Cards- regular rate   Resp- nonlabored breathing on room air   Abdomen- soft, not tender, not distended  Extremities- ARMAS   Skin- warm, dry   Neuro- alert and oriented x3   Psych- appropriate mood   Tubes/lines- PIV    Vascular  - pulsatile mass appreciated lateral to umbilicus; not tender  - fem palpable bilaterally  - PT/DP multiphasic bilaterally  - motor and sensory intact bilaterally       Results for orders placed or performed during the hospital encounter of 03/19/24 (from the past 96 hour(s))   CBC and Auto Differential   Result Value Ref Range    WBC 11.7 (H) 4.4 - 11.3 x10*3/uL    nRBC 0.0 0.0 - 0.0 /100 WBCs    RBC 4.54 4.00 - 5.20 x10*6/uL    Hemoglobin 13.3 12.0 - 16.0 g/dL    Hematocrit 40.3 36.0 - 46.0 %    MCV 89 80 - 100 fL    MCH 29.3 26.0 - 34.0 pg    MCHC 33.0 32.0 - 36.0 g/dL    RDW 14.2 11.5 - 14.5 %    Platelets 243 150 - 450 x10*3/uL    Neutrophils % 80.3 40.0 - 80.0 %    Immature Granulocytes %, " Automated 0.5 0.0 - 0.9 %    Lymphocytes % 10.2 13.0 - 44.0 %    Monocytes % 7.8 2.0 - 10.0 %    Eosinophils % 0.8 0.0 - 6.0 %    Basophils % 0.4 0.0 - 2.0 %    Neutrophils Absolute 9.40 (H) 1.60 - 5.50 x10*3/uL    Immature Granulocytes Absolute, Automated 0.06 0.00 - 0.50 x10*3/uL    Lymphocytes Absolute 1.19 0.80 - 3.00 x10*3/uL    Monocytes Absolute 0.91 (H) 0.05 - 0.80 x10*3/uL    Eosinophils Absolute 0.09 0.00 - 0.40 x10*3/uL    Basophils Absolute 0.05 0.00 - 0.10 x10*3/uL   Comprehensive metabolic panel   Result Value Ref Range    Glucose 187 (H) 74 - 99 mg/dL    Sodium 138 136 - 145 mmol/L    Potassium 4.2 3.5 - 5.3 mmol/L    Chloride 100 98 - 107 mmol/L    Bicarbonate 27 21 - 32 mmol/L    Anion Gap 15 10 - 20 mmol/L    Urea Nitrogen 23 6 - 23 mg/dL    Creatinine 1.06 (H) 0.50 - 1.05 mg/dL    eGFR 50 (L) >60 mL/min/1.73m*2    Calcium 10.1 8.6 - 10.6 mg/dL    Albumin 4.1 3.4 - 5.0 g/dL    Alkaline Phosphatase 64 33 - 136 U/L    Total Protein 7.2 6.4 - 8.2 g/dL    AST 15 9 - 39 U/L    Bilirubin, Total 0.7 0.0 - 1.2 mg/dL    ALT 20 7 - 45 U/L   Magnesium   Result Value Ref Range    Magnesium 1.86 1.60 - 2.40 mg/dL   Phosphorus   Result Value Ref Range    Phosphorus 3.2 2.5 - 4.9 mg/dL   Protime-INR   Result Value Ref Range    Protime 10.4 9.8 - 12.8 seconds    INR 0.9 0.9 - 1.1

## 2024-03-20 NOTE — DISCHARGE SUMMARY
Discharge Diagnosis  Abdominal pain    Issues Requiring Follow-Up  See above    Test Results Pending At Discharge  Pending Labs       No current pending labs.            Hospital Course   Mrs. Gutierrez was admitted to the clinical decision unit for abdominal pain with nausea and vomiting.  Medical workup included EKG, laboratory studies and CT abdomen and pelvis.  Diagnostic information was obtained, reviewed and discussed with the patient.  No critical lab abnormalities noted.  CBC showed a mild leukocytosis at 11.7.  No anemia, electrolyte derangement or hepatic abnormalities.  Urinalysis without findings of infection.  CT abdomen and pelvis revealed a stable 7 mm cystic lesion within the body of the pancreas which could represent a simple cyst, pseudocyst also show colonic diverticulosis fluid within the colon.  Additionally, there was a 5.7 x 4.7 cm infrarenal abdominal aortic aneurysm which has increased in size compared to her prior exams.  Vascular surgery was contacted and formal consult was requested.  Patient was evaluated by the vascular surgical team and found no indications for acute intervention.  Patient was placed into the clinical decision unit for serial abdominal exams and p.o. challenge.  Patient remained clinically, hemodynamically and neurologically intact during her CDU admission.  On evaluation this morning Mrs. Zhou informed me that her abdominal pain, nausea and vomiting has resolved.  Patient is tolerating p.o. without difficulty.  Examination of the abdomen was benign.  Plan to discharge home with instructions to follow-up with her PCP in the next 2 to 4 weeks.  Patient should follow-up with vascular surgery clinic as needed.  She was instructed to return to the emergency department she experiences any new or worsening symptoms or has any other concerns.  Plan of care discussed with the patient and she verbalized understanding and is in agreement was discharged home in stable  condition.    Pertinent Physical Exam At Time of Discharge  Physical Exam      Physical Exam:    General: Alert, oriented x 4, in no acute distress, well-nourished and well-hydrated. Calm and cooperative.    HEENT: Normocephalic and atraumatic. Pupils equal, round and reactive to light. Conjunctiva is clear. No scleral icterus. Extraocular motions are intact. Hearing is grossly intact. External auditory canals patent. Nares patent, without drainage or discharge or bleeding. Oropharynx pink and moist without swelling, erythema, drainage or exudate. Uvula is midline. No oral lesions.    Neck: Supple,  without palpable masses. Trachea is midline. Full active range of motion noted. No meningismus. No lymphadenopathy.    Heart: Regular rate and rhythm, S1, S2 is normal, S3, S4s absent. No murmur, rubs or gallops noted. Pulses full and equal bilaterally.    Lungs: Clear to auscultation bilaterally. Symmetrical chest wall rise and fall. No wheezing, rales or rhonchi. Regular respiratory rate effort and pattern noted. No accessory muscle usage. Patient able to speak full sentences without any difficulty.    Abdomen: Soft, nontender, nondistended. Bowel sounds present and active. There is no guarding, rebound, rigidity or palpable or pulsatile masses. No organomegaly. Negative Aceves sign. Negative McBurney point tenderness. No CVA tenderness.    Genitourinary: Exam deferred.    Extremities: Nontender. No deformities. Without clubbing, cyanosis or edema. Warm, well-perfused. Full active range of motion noted. Normal bulk and tone.    Neuro: No focal motor or sensory deficits noted. Upper and lower extremity muscle strength 5 over 5. No arm drift, facial droop or slurred speech.Cranial nerves II through XII are grossly intact, normal sensation, no weakness, no focal findings identified.    Back: Normal spinal alignment and curvature with full active range of motion noted. No midline spinal tenderness. No palpable  masses.    Skin: Warm, dry and intact. No rashes, lesions, abrasions, lacerations, contusions, petechia, purpura or skin tears noted.    Psychiatric: Appropriate mood and affect.       Home Medications     Medication List      ASK your doctor about these medications     acetaminophen 500 mg tablet; Commonly known as: Tylenol   amLODIPine 5 mg tablet; Commonly known as: Norvasc   ascorbic acid 500 mg tablet; Commonly known as: Vitamin C   atorvastatin 80 mg tablet; Commonly known as: Lipitor   bisacodyl 10 mg suppository; Commonly known as: Dulcolax   calcium carbonate-vitamin D3 500 mg-5 mcg (200 unit) tablet   cholecalciferol 25 MCG (1000 UT) capsule; Commonly known as: Vitamin D-3   clobetasol 0.05 % cream; Commonly known as: Temovate   Colace 100 mg capsule; Generic drug: docusate sodium   diclofenac sodium 1 % kit   Eliquis 2.5 mg tablet; Generic drug: apixaban   ferrous sulfate 325 (65 Fe) MG EC tablet   fluticasone propion-salmeteroL 250-50 mcg/dose diskus inhaler; Commonly   known as: Advair Diskus   gabapentin 100 mg capsule; Commonly known as: Neurontin   hydroCHLOROthiazide 12.5 mg capsule; Commonly known as: Microzide   hydrocortisone 10 % (80 mg) rectal foam; Commonly known as: Proctocort   hydrocortisone 2.5 % cream   lidocaine 5 % patch; Commonly known as: Lidoderm   loperamide 2 mg capsule; Commonly known as: Imodium   melatonin 1 mg tablet   Miralax 17 gram/dose powder; Generic drug: polyethylene glycol   montelukast 10 mg tablet; Commonly known as: Singulair   NON FORMULARY   potassium chloride CR 20 mEq ER tablet; Commonly known as: Klor-Con M20   predniSONE 2.5 mg tablet; Commonly known as: Deltasone   propranolol 20 mg tablet; Commonly known as: Inderal   sennosides 8.6 mg tablet; Commonly known as: Senokot     Attending: Cover, MD  Admission Time: 0203  Duration: 8 hrs      Outpatient Follow-Up  Future Appointments   Date Time Provider Department Center   4/15/2024  2:40 PM Salud Gambino,  APRN-CNP [id number] [facility]   5/17/2024  2:00 PM Franck Cohen MD [id number] [facility]   6/13/2024 11:45 AM David Matthew MD [id number] [facility]   11/22/2024  2:00 PM LEWIS Brewster, CCC-A [id number] [facility]   Follow- up with Vascular surgery as needed    Omer Deng PA-C

## 2024-03-20 NOTE — PROGRESS NOTES
Emergency Medicine Transition of Care Note.    I received Courtney Zhou in signout from Dr. Patricio.  Please see the previous ED provider note for all HPI, PE and MDM up to the time of signout at 2300. This is in addition to the primary record.    In brief Courtney Zhou is an 89 y.o. female presenting for   Chief Complaint   Patient presents with   • Abdominal Pain     At the time of signout we were awaiting: Vascular surgery recommendations    ED Course as of 03/20/24 0505   Tue Mar 19, 2024   1823 EKG shows a normal rate and rhythm, leftward axis, normal intervals. And normal ST and T wave pattern with no evidence of acute ischemia or other acute findings [SC]   Wed Mar 20, 2024   0502 Patient signed out to me pending vascular surgery recommendations.  Vascular surgery came down and evaluated patient.  I did have discussion regarding outpatient elective emergent surgery which patient did not reportedly want.  Otherwise cleared for discharge from vascular surgery standpoint.  Given this, we will plan to admit patient to the hospital for serial abdominal dams to CDU.  CDU providers accepted patient [MJ]      ED Course User Index  [MJ] Riaz Boudreaux DO  [SC] Dinah Patricio MD         Diagnoses as of 03/20/24 0505   Generalized abdominal pain       Medical Decision Making      Final diagnoses:   None           Procedure  Procedures    Riaz Boudreaux DO

## 2024-03-20 NOTE — H&P
History Of Present Illness    Patient History  Patient is a 89-year-old female with history of left MCA stroke in 2020, A-fib on Eliquis, hypertension, hyperlipidemia, breast cancer status postchemotherapy and radiation, MVP presented to the ED for abdominal pain.  Noted associated vomiting, decreased bowel movements and decreased p.o. intake.  Comprehensive workup obtained in the ED.  CBC showed mild leukocytosis at 11.7, no anemia.  Urinalysis negative for any infection.  CMP showed no evidence of renal, electrolyte or hepatic abnormality.  Urinalysis without any evidence of infection.  Magnesium and phosphorus within normal range.  CT of the abdomen and pelvis showed stable 7mm cystic lesion within the body of the pancreas which could represent a simple cyst, pseudocyst, also showed colonic diverticulosis, fluid within the colon.  There was noted a 5.7 x 4.7 cm infrarenal abdominal aortic aneurysm which is increased in size compared to prior exam.  Therefore, vascular medicine was consulted and signed off on the patient.  Given patient's abdominal pain, was elected by the ED team that patient be placed in the CDU for serial abdominal examinations.    Patient evaluated in ED bed 12 as there was no bed availability at the time of admission.  Patient states that her pain is currently controlled.  Rates it a 3 out of 10 in severity and mostly located in the left lower quadrant when it occurs.  Denies any nausea, vomiting.    Review of Systems  Review of Systems  General: No fevers, chills, diaphoresis  Cardiovascular: No chest pain, palpitations  Pulmonary: No shortness of breath, cough, wheezing, hemoptysis  Gastrointestinal: + abdominal pain. No nausea, vomiting, diarrhea    All systems were reviewed and are negative unless otherwise stated above.      Physical Examination  VS: As documented in the triage note and EMR flowsheet from this visit was reviewed  General: Well appearing. No acute distress.   Eyes:   Extraocular movements grossly intact. No scleral icterus.   Head: Atraumatic. Normocephalic.     Neck: No meningismus. No gross masses. Full movement through range of motion  CV: Regular rhythm. No murmurs, rubs, gallops appreciated.   Resp: Clear to auscultation bilaterally. No respiratory distress.    GI: Nontender. Soft. No masses. No rebound, rigidity or guarding.   MSK: Symmetric muscle bulk. No gross step offs or deformities.  Skin: Warm, dry. No rashes  Neuro: CN II-VII intact. A&O x3. Speech fluent. Alert. Moving all extremities. Ambulates with normal gait  Psych: Appropriate mood and affect for situation      Consultants: Vascular surgery:   - Pt is a 89 y.o. female with hx of known infrarenal abdominal aortic aneurysm who presents with 1 day of infra-umbilical abdominal pain, that is now resolved with morphine in the ED. Not concerned for rupture of aneurysm. Her aneurysmal growth of 1 cm/2 years does not warrant repair but the current size of 5.7 cm does prompt discussion of elective repair. Difficult to determine whether her abdominal pain on presentation was caused by the AAA.      Risks and benefits of open and endovascular repair of infrarenal abdominal aortic aneurysm discussed, including elective and emergent treatment, recovery, possible postoperative complications. Pt communicated that she does not want elective or emergent repair. Should she change her mind, she can contact our vascular surgery office.      Recommendations  - pain control can be managed by PCP       Impression and Plan  In summary, Courtney Zhou  is admitted to the [facility] for Emergency Medicine Clinical Decision Unit for abdominal pain. Dr. Jackson is the CDU admission attending.    This patient has been risk-stratified based on available history, physical exam, and related study findings. Admission to the observation status for further diagnosis/treatment/monitoring of abdominal pain is warranted clinically. This extended  period of observation is specifically required to determine the need for hospitalization.     The goals of this admission based on the patient's clinical problem list are:  1) Abdominal pain/AAA  -Serial abdominal exams  -PRN pain medication  -If patient abdominal exam remains benign, can be discharged with follow up with vascular medicine.     We will observe the patient for the following endpoints:   1) Symptomatic improvement  2) Stable vital signs    When met, appropriate disposition will be arranged  Nolvia Gan PA-C

## 2024-03-22 ENCOUNTER — TELEPHONE (OUTPATIENT)
Dept: GERIATRIC MEDICINE | Facility: CLINIC | Age: 89
End: 2024-03-22
Payer: MEDICARE

## 2024-03-22 DIAGNOSIS — R10.9 ABDOMINAL PAIN, UNSPECIFIED ABDOMINAL LOCATION: Primary | ICD-10-CM

## 2024-03-22 RX ORDER — OXYCODONE HYDROCHLORIDE 5 MG/1
2.5 TABLET ORAL EVERY 8 HOURS PRN
Qty: 15 TABLET | Refills: 0 | Status: SHIPPED | OUTPATIENT
Start: 2024-03-22

## 2024-03-22 NOTE — PROGRESS NOTES
Pt having severe abdominal pain. Had been in ER recently. Work up unremarkable. Given morphine in hospital. D/jeromy just on zofran prn. Still having severe pain. Will start oxycodone 2.5mg q8H PRN. Bowel regimen had been increased upon arrival back to [facility] after er visit.

## 2024-03-24 VITALS
TEMPERATURE: 98.6 F | DIASTOLIC BLOOD PRESSURE: 80 MMHG | SYSTOLIC BLOOD PRESSURE: 128 MMHG | RESPIRATION RATE: 20 BRPM | HEART RATE: 70 BPM

## 2024-03-24 PROBLEM — H61.20 IMPACTED CERUMEN: Status: ACTIVE | Noted: 2024-03-24

## 2024-03-24 PROBLEM — R63.0 DECREASED APPETITE: Status: ACTIVE | Noted: 2024-03-24

## 2024-03-24 PROBLEM — H91.93 BILATERAL HEARING LOSS: Status: ACTIVE | Noted: 2024-03-24

## 2024-03-24 PROBLEM — K59.00 CONSTIPATION: Status: ACTIVE | Noted: 2024-03-24

## 2024-03-25 ENCOUNTER — OFFICE VISIT (OUTPATIENT)
Dept: GASTROENTEROLOGY | Facility: CLINIC | Age: 89
End: 2024-03-25
Payer: MEDICARE

## 2024-03-25 VITALS
SYSTOLIC BLOOD PRESSURE: 132 MMHG | HEIGHT: 65 IN | HEART RATE: 70 BPM | DIASTOLIC BLOOD PRESSURE: 76 MMHG | BODY MASS INDEX: 21.99 KG/M2 | WEIGHT: 132 LBS

## 2024-03-25 DIAGNOSIS — K86.2 PANCREATIC CYST (HHS-HCC): ICD-10-CM

## 2024-03-25 DIAGNOSIS — I71.40 ABDOMINAL AORTIC ANEURYSM (AAA) WITHOUT RUPTURE, UNSPECIFIED PART (CMS-HCC): ICD-10-CM

## 2024-03-25 DIAGNOSIS — R33.9 URINARY RETENTION: ICD-10-CM

## 2024-03-25 DIAGNOSIS — R10.9 ABDOMINAL PAIN, UNSPECIFIED ABDOMINAL LOCATION: Primary | ICD-10-CM

## 2024-03-25 PROCEDURE — 99205 OFFICE O/P NEW HI 60 MIN: CPT | Performed by: STUDENT IN AN ORGANIZED HEALTH CARE EDUCATION/TRAINING PROGRAM

## 2024-03-25 PROCEDURE — 1160F RVW MEDS BY RX/DR IN RCRD: CPT | Performed by: STUDENT IN AN ORGANIZED HEALTH CARE EDUCATION/TRAINING PROGRAM

## 2024-03-25 PROCEDURE — 1036F TOBACCO NON-USER: CPT | Performed by: STUDENT IN AN ORGANIZED HEALTH CARE EDUCATION/TRAINING PROGRAM

## 2024-03-25 PROCEDURE — 1159F MED LIST DOCD IN RCRD: CPT | Performed by: STUDENT IN AN ORGANIZED HEALTH CARE EDUCATION/TRAINING PROGRAM

## 2024-03-25 PROCEDURE — 3078F DIAST BP <80 MM HG: CPT | Performed by: STUDENT IN AN ORGANIZED HEALTH CARE EDUCATION/TRAINING PROGRAM

## 2024-03-25 PROCEDURE — 3075F SYST BP GE 130 - 139MM HG: CPT | Performed by: STUDENT IN AN ORGANIZED HEALTH CARE EDUCATION/TRAINING PROGRAM

## 2024-03-25 NOTE — PATIENT INSTRUCTIONS
Dear Mrs. Zhou,    It was a pleasure seeing you today.   For the constipation, we discussed the following:   Senna and Colace previously discontinued due to diarrhea  -Will reintroduce slowly   -resume Senna daily  -Add Prune Juice 4 ounces daily with breakfast  - If no improvement, add Miralax daily Scheduled and Resume Colace  -Also, consider discontinuing Ferrous Sulfate with Vitamin C   -Plan of care is ongoing       I will follow up with you in one month or sooner if needed.     Sincerely,    Bruna Cloud, MSN, APRN-BC

## 2024-03-25 NOTE — PATIENT INSTRUCTIONS
1-please keep taking the MiraLAX daily, simethicone 3 times per day as needed, we need to check an xray and some blood tests  2-will refer to palliative care and to urology in view of the urinary retention please follow with vascular regarding the aortic aneurysm  3-you can take Ensure or boost supplementation to ensure adequate nutrition

## 2024-03-25 NOTE — PROGRESS NOTES
Some elements may have been copied from prior note(s). The elements have been updated and reflect current evaluation, examination and decision making from today.             Reason for visit:  Constipation         Summary Statement: Mrs. Zhou is an 89 yo elderly woman with a PMH significant central retinal artery occlusion-right eye (while off Xarelto 3/14/2023) ), for+ Covid 19 virus (9/2022), Ischemic left frontal and parietal and right temporal lobe strokes with new right-sided weakness (9/2020), AAA, paroxysmal atrial fibrillation (on Eliquis (developed GI bleeding on Xarelto) ), mitral valve prolapse, HTN, steroid -induced DM, PMR (polymyalgia rheumatic a-on Prednisone- condition worsened when Prednisone was decreased ), Chiari Malformation, CKD Stage 3, dementia, asthma, bradycardia, OA left knee, gait abnormality, GERD, incontinence , hearing loss, right hip hemiarthroplasty s/p fall (5/2021),  and current left breast mass ( declining further workup)     4/27/2022: Incidental findings on CT   include irregular consolidative opacity right lung apex - 8-12 week follow up   recommended to evaluate for resolution, left breast hypodense mass measuring   2.4 x 2.1, and 4.5 cm fusiform infrarenal abdominal aortic aneurysm.        -Squamous Cells, scalp 2013 and 2017 ([facility] surgery 2013)  -Malignant Melanoma left leg(1990's) and upper left arm (2008)  -Breast Cancer (2013) chemotherapy and radiation (completed 4/2014)        Reason for homebound status: Leaving her home requires the assistance of another person.     HPI: Mrs. Zhou is being seen today in her apartment     In the interim, staff called and asked if Prn stool softener and laxative cold be changed to scheduled due to patient does not ask for it and is complaining of constipation.  Also reported that for the past couple of days she has had a decreased appetite.     Patient verifies the above.  NP discussed that at the last visit, these were  "changed to prn due to patient complained of diarrhea.  States current BMs are \"very small and hard\".        In the interim she followed up with ENT for cerumen removal.     Review of Systems     Constitutional: Sitting upright in recliner chair with feet elevated.  Napping. Arouses easily. NAD.  Appetite somewhat has slacked off,~no fatigue,~no fever,~no chills,~not feeling poorly~and~not feeling tired.   Eyes: no vision problems,~no eye pain,~eyes not red,~no purulent discharge from the eyes,~no dryness of the eyes~and~no itching of the eyes. States vision in right eye has almost returned to baseline.   ENT: hearing loss, but~no earache,~no sinus pressure,~no nosebleeds,~no nasal discharge,~no sore throat~and~no hoarseness. Hearing aids intact.   Cardiovascular: lower extremity edema, but~no chest pain~and~the heart rate was not fast.   Respiratory: no shortness of breath,~not coughing up sputum,~no cough,~no wheezing~and~no shortness of breath during exertion.   Gastrointestinal: no abdominal pain,~no nausea,~no vomiting,~no dysphagia,~no diarrhea + constipation “little rocks”, an no blood in stools.   Genitourinary: no dysuria,~no incontinence,~no change in urinary frequency,~no hematuria~and~no unexplained vaginal bleeding.   Musculoskeletal: arthralgias, but~no difficulty walking,~no myalgias,~no muscle aches,~no joint stiffness,~no muscle cramps,~no limb swelling~and~no back pain.   Skin: + skin left breast lump, but~no rashes~and~no itching.   Neurological: limb weakness, but~no headache,~no dizziness,~no mental status change,~no confusion,~no convulsions,~tremors to hands~no numbness,~no fainting/syncope,~no tingling/paresthesia~and~no difficulty with balance. Endorses tremors to hands   Psychiatric: no sleep disturbances,~not under stress,~no anxiety,~no depression~and~not suicidal.   Endocrine: no hot flashes.   Hematologic/Lymphatic: a tendency for easy bruising, but~no tendency for easy bleeding.       " Physical Exam  /80 (BP Location: Right arm, Patient Position: Sitting, BP Cuff Size: Adult)   Pulse 70   Temp 37 °C (98.6 °F) (Temporal)   Resp 20 /POX=95 % RA     Weights:  3/5/2024= 122 lbs. (Standing)  2/2/2024= 125.6 lbs (standing)  1/5/2024 = 127.8 lbs (wheelchair)  12/19/2023=130 lbs. (Standing)    Constitutional   General appearance: Sitting upright in recliner with feet elevated. Napping.  Arouses easily.  cooperative and in no acute distress.   Head and Face   Head and face: Normal. ~   External palpation of the face and sinuses: Normal  Examination/ inspection of hair and scalp: Normal.   Eyes   Inspection of eyes: Sclera and conjunctiva were normal.~   Pupil exam: JAY. Extraocular movements were intact-wears eyeglasses.   Ears, Nose, Mouth, and Throat   Ears: Normal  Oropharynx: Normal with moist mucus membranes, tongue midline, no PND, no erythema or enlargement of tonsils.~   Hearing: Abnormal. ~ wears hearing aids. +  [place]ribe  Lips, teeth, and gums: Normal.  Neck   Neck Exam: Appearance of the neck was normal. No neck masses observed. No jugular vein distension.   Pulmonary   Respiratory assessment: No respiratory distress, normal respiratory rhythm and effort.~   Auscultation of Lungs: Clear bilateral breath sounds. No rales, rhonchi or wheezes.   Cardiovascular   Auscultation of heart: Apical pulse normal, heart rate and rhythm normal, normal S1 and S2, no murmurs, no gallops and no pericardial rub  Carotid arteries: Pulses normal with no bruits  Pedal pulses: deferred  Examination of extremities for edema and/or varicosities:   Abnormal: Trace BLE    Chest   Breast exam: Abnormal. ~   Chest: Abnormal. ~palpable nodule left breast -no lymphadenopathy -unchanged from previous visits.   Abdomen   Abdominal Exam: No bruits normal bowel sounds, soft, non-tender, no abdominal masses palpated. Bowel sounds normoactive x 4 quadrants.   Genitourinary   Bladder: Nondistended. Wearing a diaper.    Lymphatic   Palpation of lymph nodes in axillae: Normal. ~   Palpation of lymph nodes in neck: No cervical lymphadenopathy.~   Palpation of lymph nodes in other areas: Normal.    Musculoskeletal   Digits and nails: Abnormal. ~   Inspection/palpation of joints, bones, and muscles: Abnormal. ~   Range of motion: Abnormal. ~   Muscle strength/tone: Normal.    Skin   Skin and subcutaneous tissue: Abnormal. ~previous scarring from skin cancer treatment to bridge of nose, and lesion-flat-discolored, nickel-quarter size lesion to left cheek.  Neurologic   Cranial nerves: Abnormal. ~speech slightly dysarthric, [place], Right sided weakness. Occasional tremor to right hand .   Psychiatric   Judgment and insight: Intact and appropriate.~   Orientation: Oriented to person, place, and time.~   Mood and affect: Normal.~   Recent and remote memory: Normal    LAB RESULTS  -10/11/2023 most recent labs reviewed in chart at [facility]     ASSESSMENT/PLAN    1. Constipation, unspecified constipation type  -Senna and Colace previously discontinued due to diarrhea  -Will reintroduce slowly   -resume Senna daily  -Add Prune Juice 4 ounces daily with breakfast  - If no improvement, add Miralax daily Scheduled and Resume Colace  -Also, consider discontinuing Ferrous Sulfate with Vitamin C   -Plan of care is ongoing       2. Decreased appetite  -for a couple of days  -possibly due to constipation  -denies nausea  -no significant weight loss in the last month  -Add on nutritional supplement (Boost or Ensure) daily between lunch and dinner  -Plan of care is ongoing    Stable  Routine follow up in 1 month or sooner if needed    2. Bilateral hearing loss, unspecified hearing loss type/ Impacted cerumen, unspecified laterality    -reports improvement in hearing after removal of cerumen with ENT  -continue hearing aids

## 2024-03-25 NOTE — PROGRESS NOTES
89-year-old lady accompanied by her son and granddaughter with history of CVA in 2020, atrial fibrillation on Eliquis, MVP, breast cancer status postchemotherapy and radiation, dyslipidemia hypertension neuropathy hysterectomy for bleeding, cholecystectomy, skin squamous cell cancer presenting on 3/19/2024 to the emergency department for suprapubic pain and vomiting, found to have urinary retention and worsening of abdominal aortic aneurysm.  Patient was seen by vascular but refused surgery.  She is refusing endoscopy or colonoscopy.  She underwent straight catheterization to the bladder but she does not recall if she felt better afterwards.  She mentioned that since 6 weeks ago at the assisted living she started having 1 bowel movement every 5 days, previously every 3 days with significant straining, she mentions periumbilical and diffuse abdominal pain mainly on the left side, steady and pressure-like, positive at night, lasting for hours, associated with gurgling in her belly and decreased p.o. intake, partially better with passing gas and bowel movements.    EGD never  Colonoscopy 15 years ago unknown result  Sister has leukemia  Bowel movements as above  Denies NSAIDs alcohol marijuana drug use smoking          The note was created using voice recognition transcription software. Despite proofreading, unintentional typographical errors may be present. Please contact the GI office with any questions or concerns.     Current Medications: reviewed    A 10 point review of system is negative except for what is mentioned in the HPI    Follow up with GI was advised       Vital Signs: Reviewed    Physical Exam:  General: no apparent distress, pleasant and cooperative  Skin:  Warm and dry, no jaundice  HEENT: No scleral icterus, no conjunctival pallor, normocephalic, atraumatic, mucous membranes moist  Neck:  atraumatic, trachea midline, no JVD  Chest:  decreased air entry to auscultation bilaterally. No wheezes,  rales, or rhonchi  CV:  Regular rate and rhythm.  Positive S1/S2  Abdomen: Positive gurgling no distension, +BS, soft, mild diffuse-tender to palpation, no rebound tenderness, no guarding, no rigidity, no discernible ascites   Extremities: no lower extremity edema, Chronic pigmentary changes, no cyanosis  Neurological: Alert cooperative, no asterixis  Psychiatric: cooperative     Investigations:  Labs, radiological imaging and cardiac work up were reviewed    1-BMI 21, healthy lifestyle advised    2-Healthcare maintenance, colonoscopy not recommended in the patient's age group    3-abdominal pain and 1 episode of vomiting described as above, in the setting of multiple comorbidities as above, likely multifactorial  *Urinary retention status post straight catheterization with 2.3 L of urine collected, follow with urology  *Abdominal aortic aneurysm of 5.7 cm, followed with vascular who offered surgery, patient refused surgical interventions  *Fluid in the colon CAT scan 3/19/2024, reported history of infrequent bowel movements in 6 weeks, rule out ischemic colitis among others, check lactic acid, KUB, patient refusing endoscopy or colonoscopy, risks and benefits explained of not undergoing interventions if needed explained including risks of missing lesion among other that can lead to multiple complications and death, patient understands and verbalized her understanding, communicated her interest in palliative care    4-7 mm pancreatic cyst, stable according to the report

## 2024-03-26 ENCOUNTER — HOME VISIT (OUTPATIENT)
Dept: GERIATRIC MEDICINE | Facility: CLINIC | Age: 89
End: 2024-03-26
Payer: MEDICARE

## 2024-03-26 DIAGNOSIS — K92.1 BLACK TARRY STOOLS: ICD-10-CM

## 2024-03-26 DIAGNOSIS — Z51.5 HOSPICE CARE PATIENT: Primary | ICD-10-CM

## 2024-03-26 DIAGNOSIS — R10.9 ABDOMINAL PAIN OF UNKNOWN ETIOLOGY: Primary | ICD-10-CM

## 2024-03-26 DIAGNOSIS — R10.9 ABDOMINAL PAIN, UNSPECIFIED ABDOMINAL LOCATION: ICD-10-CM

## 2024-03-26 DIAGNOSIS — I71.40 ABDOMINAL AORTIC ANEURYSM (AAA) WITHOUT RUPTURE, UNSPECIFIED PART (CMS-HCC): ICD-10-CM

## 2024-03-26 DIAGNOSIS — I63.9 RECURRENT STROKES (MULTI): ICD-10-CM

## 2024-03-26 DIAGNOSIS — R33.9 URINARY RETENTION: ICD-10-CM

## 2024-03-26 PROCEDURE — 99349 HOME/RES VST EST MOD MDM 40: CPT | Performed by: NURSE PRACTITIONER

## 2024-03-26 RX ORDER — LORAZEPAM 0.5 MG/1
0.5 TABLET ORAL EVERY 6 HOURS PRN
Qty: 28 TABLET | Refills: 0 | Status: SHIPPED | OUTPATIENT
Start: 2024-03-26 | End: 2024-04-02

## 2024-03-26 RX ORDER — MORPHINE SULFATE 20 MG/ML
10 SOLUTION ORAL EVERY 12 HOURS
Qty: 30 ML | Refills: 0 | Status: SHIPPED | OUTPATIENT
Start: 2024-03-26 | End: 2024-04-05

## 2024-03-26 RX ORDER — MORPHINE SULFATE 20 MG/ML
10 SOLUTION ORAL EVERY 4 HOURS PRN
Qty: 30 ML | Refills: 0 | Status: SHIPPED | OUTPATIENT
Start: 2024-03-26 | End: 2024-04-05

## 2024-03-26 ASSESSMENT — PAIN SCALES - GENERAL: PAINLEVEL: 6

## 2024-03-31 VITALS — HEART RATE: 74 BPM | DIASTOLIC BLOOD PRESSURE: 70 MMHG | SYSTOLIC BLOOD PRESSURE: 140 MMHG | RESPIRATION RATE: 20 BRPM

## 2024-03-31 PROBLEM — R10.9 ABDOMINAL PAIN OF UNKNOWN ETIOLOGY: Status: ACTIVE | Noted: 2024-03-31

## 2024-03-31 PROBLEM — R33.9 URINARY RETENTION: Status: ACTIVE | Noted: 2024-03-31

## 2024-03-31 PROBLEM — K92.1 BLACK TARRY STOOLS: Status: ACTIVE | Noted: 2024-03-31

## 2024-03-31 NOTE — PROGRESS NOTES
Some elements may have been copied from prior note(s). The elements have been updated and reflect current evaluation, examination and decision making from today.                           Reason for visit:  Abdominal Pain         Summary Statement: Mrs. Zhou is an 89 yo elderly woman with a PMH significant central retinal artery occlusion-right eye (while off Xarelto 3/14/2023) ), for+ Covid 19 virus (9/2022), Ischemic left frontal and parietal and right temporal lobe strokes with new right-sided weakness (9/2020), AAA, paroxysmal atrial fibrillation (on Eliquis (developed GI bleeding on Xarelto) ), mitral valve prolapse, HTN, steroid -induced DM, PMR (polymyalgia rheumatic a-on Prednisone- condition worsened when Prednisone was decreased ), Chiari Malformation, CKD Stage 3, dementia, asthma, bradycardia, OA left knee, gait abnormality, GERD, incontinence , hearing loss, right hip hemiarthroplasty s/p fall (5/2021),  and current left breast mass ( declining further workup)     4/27/2022: Incidental findings on CT   include irregular consolidative opacity right lung apex - 8-12 week follow up   recommended to evaluate for resolution, left breast hypodense mass measuring   2.4 x 2.1, and 4.5 cm fusiform infrarenal abdominal aortic aneurysm.        -Squamous Cells, scalp 2013 and 2017 ([facility] surgery 2013)  -Malignant Melanoma left leg(1990's) and upper left arm (2008)  -Breast Cancer (2013) chemotherapy and radiation (completed 4/2014)        Reason for homebound status: Leaving her home requires the assistance of another person.     HPI: Mrs. Zhou is being seen today in her apartment in the presence of her son.  Patient reports a 2 week history of intermittent episodes of abdominal pain in the setting of constipation.  Despite laxatives/stool softeners being adjusted, pain persists.   Patient presented to the ED on 3/20/2024- per ED note, she was found to have urinary retention with a residual of 2.3 L.   "Relieved with straight catheterization and IV Morphine. Abcominal CT showed a slight interval increase in AAA, but no s/s of leaking.   Patient was prescribed Oxycodone prn subsequently, but achieved no relief.  She was advised to follow up with GI which she did on 3/25/2024.  Based upon this evaluation, patient was referred back to the facility .   Today, she has elected to have no further workup and wishes to be referred to Hospice.   She endorses LLQ abdominal pain 6/10 -aching.  Nurse had obtained a bladder scan prior to NP s .katiearrival and it was normal.  At the time of visit, her bladder is distended.  NP went to get bladder scan, and upon return patient was in the BR.   NP asked her not to flush the toilet.  Toilet had a large amount of watery , black tarry stools. Patient was not aware of this nor she could not state if she had also urinated.  Of note, bladder is no longer distended and bladder scan read \"0 ml\"        Review of Systems     Constitutional: Sitting upright in recliner chair with feet elevated. Mild distress due to abdominal pain.   Appetite somewhat has slacked off,~no fatigue,~no fever,~no chills,~not feeling poorly~and~not feeling tired.   Eyes: no vision problems,~no eye pain,~eyes not red,~no purulent discharge from the eyes,~no dryness of the eyes~and~no itching of the eyes.   ENT: hearing loss, but~no earache,~no sinus pressure,~no nosebleeds,~no nasal discharge,~no sore throat~and~no hoarseness. Hearing aids intact.   Cardiovascular: lower extremity edema, but~no chest pain~and~the heart rate was not fast.   Respiratory: no shortness of breath,~not coughing up sputum,~no cough,~no wheezing~and~no shortness of breath during exertion.   Gastrointestinal: no abdominal pain,~no nausea,~no vomiting,~no dysphagia,~no diarrhea + constipation “little rocks”, an no blood in stools.   Genitourinary: no dysuria,~no incontinence,~no change in urinary frequency,~no hematuria~and~no unexplained " vaginal bleeding.   Musculoskeletal: arthralgias, but~no difficulty walking,~no myalgias,~no muscle aches,~no joint stiffness,~no muscle cramps,~no limb swelling~and~no back pain.   Skin: + skin left breast lump, but~no rashes~and~no itching.   Neurological: limb weakness, but~no headache,~no dizziness,~no mental status change,~no confusion,~no convulsions,~tremors to hands~no numbness,~no fainting/syncope,~no tingling/paresthesia~and~no difficulty with balance. Endorses tremors to hands   Psychiatric: no sleep disturbances,~not under stress,~no anxiety,~no depression~and~not suicidal.   Endocrine: no hot flashes.   Hematologic/Lymphatic: a tendency for easy bruising, but~no tendency for easy bleeding.       Physical Exam       Constitutional   /70 (BP Location: Right arm, Patient Position: Sitting, BP Cuff Size: Adult)   Pulse 74   Resp 20   /POX=96% RA    General appearance: Alert and oriented. Mild distress.   Head and Face   Head and face: Normal.   External palpation of the face and sinuses: Normal  Examination/ inspection of hair and scalp: Normal.   Eyes   Inspection of eyes: Sclera and conjunctiva were normal.~   Pupil exam: JAY. Extraocular movements were intact-wears eyeglasses.   Ears, Nose, Mouth, and Throat   Ears: Normal  Oropharynx: Normal with moist mucus membranes, tongue midline, no PND, no erythema or enlargement of tonsils.~   Hearing: Abnormal. ~ wears hearing aids. +  [place]  Lips, teeth, and gums: Normal.  Neck   Neck Exam: Appearance of the neck was normal. No neck masses observed. No jugular vein distension.   Pulmonary   Respiratory assessment: No respiratory distress, normal respiratory rhythm and effort.~   Auscultation of Lungs: Clear bilateral breath sounds. No rales, rhonchi or wheezes.   Cardiovascular   Auscultation of heart: Apical pulse normal, heart rate and rhythm normal, normal S1 and S2, no murmurs, no gallops and no pericardial rub  Carotid arteries: Pulses normal with  no bruits  Pedal pulses: deferred  Examination of extremities for edema and/or varicosities:   Abnormal: Trace BLE    Chest   Breast exam: Abnormal. ~   Chest: Abnormal. ~palpable nodule left breast -no lymphadenopathy -unchanged from previous visits.   Abdomen   Abdominal Exam: +Tenderness to LLQ. No bruits or pulsatile masses   Genitourinary   Bladder: bladder slightly distended.  Nontender (relieved after using BR)  Lymphatic   Palpation of lymph nodes in axillae: Normal. ~   Palpation of lymph nodes in neck: No cervical lymphadenopathy.~   Palpation of lymph nodes in other areas: Normal.    Musculoskeletal   Digits and nails: Abnormal. ~   Inspection/palpation of joints, bones, and muscles: Abnormal. ~   Range of motion: Abnormal. ~   Muscle strength/tone: Normal.    Skin   Skin and subcutaneous tissue: Abnormal. ~previous scarring from skin cancer treatment to bridge of nose, and lesion-flat-discolored, nickel-quarter size lesion to left cheek.  Neurologic   Cranial nerves: Abnormal. ~speech slightly dysarthric, [place], Right sided weakness. Occasional tremor to right hand .   Psychiatric   Judgment and insight: Intact and appropriate.~   Orientation: Oriented to person, place, and time.~   Mood and affect: Normal.~   Recent and remote memory: Normal       LABS/DiAGNOSTIC IMAGING    Chemistry    Lab Results   Component Value Date/Time     03/19/2024 1755    K 4.2 03/19/2024 1755     03/19/2024 1755    CO2 27 03/19/2024 1755    BUN 23 03/19/2024 1755    CREATININE 1.06 (H) 03/19/2024 1755    Lab Results   Component Value Date/Time    CALCIUM 10.1 03/19/2024 1755    ALKPHOS 64 03/19/2024 1755    AST 15 03/19/2024 1755    ALT 20 03/19/2024 1755    BILITOT 0.7 03/19/2024 1755        Lab Results   Component Value Date    WBC 11.7 (H) 03/19/2024    HGB 13.3 03/19/2024    HCT 40.3 03/19/2024    MCV 89 03/19/2024     03/19/2024     === 03/19/24 ===    CT ABDOMEN PELVIS W IV CONTRAST    - Impression  -  1.  Stable 7 mm cystic lesion within the body the pancreas described  above.  This could represent possible I PMN, simple cyst, pseudocyst.  2.  Colonic diverticulosis without definite CT evidence of  diverticulitis.  3.  There is fluid noted within the colon which would be consistent  with a history of diarrhea.  4.  No definite bowel thickening or dilatation to suggest mechanical  obstruction.  5.  There is a 5.7 x 4.7 cm infrarenal abdominal aortic aneurysm which  is increased in size compared to the prior exam.  No definite evidence  of leak or rupture.  6.  No intra-abdominal/pelvic fluid collections or pneumoperitoneum..  Signed by Abbe Bullock MD    ASSESSMENT/PLAN  1. Abdominal pain of unknown etiology  -patient has elected not to have any further additional workup  -Referrral made to [facility]     2. Abdominal aortic aneurysm (AAA) without rupture, unspecified part (CMS/HCC)  -Slight interval increase, however no s/s of leakage  -Control BP     3. Black tarry stools/Recurrent Strokes  -Ferrous sulfate stopped one week ago.  -Patient has denies any black stools  -Refer to Hospice- (Hospice Dx CVA and GI Bleed)  -NP will remain as PCP -Hospice was notified   -Will need to decide if Eliquis should be discontinue  _However, when held in the past due to GI bleed, patient sustained a stroke in her right eye, which vision has since returned  -Continue PPI      4. Urinary retention  - reportedly had 2.3 L in the ED  -Bladder

## 2024-04-05 DIAGNOSIS — R45.1 TERMINAL RESTLESSNESS: Primary | ICD-10-CM

## 2024-04-05 RX ORDER — LORAZEPAM 0.5 MG/1
0.5 TABLET ORAL 2 TIMES DAILY
Qty: 14 TABLET | Refills: 0 | Status: SHIPPED | OUTPATIENT
Start: 2024-04-05 | End: 2024-04-12

## 2024-04-15 ENCOUNTER — APPOINTMENT (OUTPATIENT)
Dept: OTOLARYNGOLOGY | Facility: CLINIC | Age: 89
End: 2024-04-15
Payer: MEDICARE

## 2024-05-17 ENCOUNTER — APPOINTMENT (OUTPATIENT)
Dept: OPHTHALMOLOGY | Facility: CLINIC | Age: 89
End: 2024-05-17
Payer: MEDICARE

## 2024-06-13 ENCOUNTER — APPOINTMENT (OUTPATIENT)
Dept: DERMATOLOGY | Facility: CLINIC | Age: 89
End: 2024-06-13
Payer: MEDICARE

## 2024-11-22 ENCOUNTER — APPOINTMENT (OUTPATIENT)
Dept: AUDIOLOGY | Facility: HOSPITAL | Age: 89
End: 2024-11-22
Payer: MEDICARE